# Patient Record
Sex: MALE | Race: WHITE | URBAN - METROPOLITAN AREA
[De-identification: names, ages, dates, MRNs, and addresses within clinical notes are randomized per-mention and may not be internally consistent; named-entity substitution may affect disease eponyms.]

---

## 2024-08-28 ENCOUNTER — OFFICE VISIT (OUTPATIENT)
Dept: PRIMARY CARE CLINIC | Age: 59
End: 2024-08-28

## 2024-08-28 VITALS
WEIGHT: 193.6 LBS | BODY MASS INDEX: 26.22 KG/M2 | SYSTOLIC BLOOD PRESSURE: 120 MMHG | DIASTOLIC BLOOD PRESSURE: 82 MMHG | HEART RATE: 61 BPM | HEIGHT: 72 IN | OXYGEN SATURATION: 98 % | TEMPERATURE: 97.7 F

## 2024-08-28 DIAGNOSIS — E11.42 TYPE 2 DIABETES MELLITUS WITH DIABETIC POLYNEUROPATHY, WITHOUT LONG-TERM CURRENT USE OF INSULIN (HCC): ICD-10-CM

## 2024-08-28 DIAGNOSIS — J44.9 CHRONIC OBSTRUCTIVE PULMONARY DISEASE, UNSPECIFIED COPD TYPE (HCC): ICD-10-CM

## 2024-08-28 DIAGNOSIS — I10 ESSENTIAL HYPERTENSION: ICD-10-CM

## 2024-08-28 DIAGNOSIS — F41.8 MIXED ANXIETY AND DEPRESSIVE DISORDER: ICD-10-CM

## 2024-08-28 DIAGNOSIS — Z12.11 COLON CANCER SCREENING: ICD-10-CM

## 2024-08-28 DIAGNOSIS — Z87.891 PERSONAL HISTORY OF TOBACCO USE: ICD-10-CM

## 2024-08-28 DIAGNOSIS — K21.9 GASTROESOPHAGEAL REFLUX DISEASE WITHOUT ESOPHAGITIS: ICD-10-CM

## 2024-08-28 DIAGNOSIS — E03.9 HYPOTHYROIDISM, UNSPECIFIED TYPE: ICD-10-CM

## 2024-08-28 DIAGNOSIS — Z00.01 ENCOUNTER FOR ROUTINE ADULT HEALTH EXAMINATION WITH ABNORMAL FINDINGS: Primary | ICD-10-CM

## 2024-08-28 DIAGNOSIS — R79.89 LOW TESTOSTERONE: ICD-10-CM

## 2024-08-28 DIAGNOSIS — N52.9 ERECTILE DYSFUNCTION, UNSPECIFIED ERECTILE DYSFUNCTION TYPE: ICD-10-CM

## 2024-08-28 RX ORDER — SEMAGLUTIDE 1.34 MG/ML
1 INJECTION, SOLUTION SUBCUTANEOUS
COMMUNITY
Start: 2024-08-16 | End: 2024-08-28 | Stop reason: SDUPTHER

## 2024-08-28 RX ORDER — FAMOTIDINE 40 MG/1
40 TABLET, FILM COATED ORAL 2 TIMES DAILY
Qty: 30 TABLET | Refills: 0 | Status: SHIPPED | OUTPATIENT
Start: 2024-08-28

## 2024-08-28 RX ORDER — VENLAFAXINE HYDROCHLORIDE 75 MG/1
150 CAPSULE, EXTENDED RELEASE ORAL DAILY
Qty: 30 CAPSULE | Refills: 0 | Status: SHIPPED | OUTPATIENT
Start: 2024-08-28

## 2024-08-28 RX ORDER — ALBUTEROL SULFATE 90 UG/1
2 AEROSOL, METERED RESPIRATORY (INHALATION) EVERY 4 HOURS PRN
COMMUNITY
Start: 2012-02-03 | End: 2024-08-28 | Stop reason: SDUPTHER

## 2024-08-28 RX ORDER — TIOTROPIUM BROMIDE 18 UG/1
18 CAPSULE ORAL; RESPIRATORY (INHALATION) DAILY
Qty: 30 CAPSULE | Refills: 0 | Status: SHIPPED | OUTPATIENT
Start: 2024-08-28

## 2024-08-28 RX ORDER — TESTOSTERONE CYPIONATE 200 MG/ML
200 INJECTION, SOLUTION INTRAMUSCULAR ONCE
COMMUNITY
Start: 2024-06-02

## 2024-08-28 RX ORDER — TIOTROPIUM BROMIDE 18 UG/1
1 CAPSULE ORAL; RESPIRATORY (INHALATION) DAILY
COMMUNITY
Start: 2012-02-03 | End: 2024-08-28 | Stop reason: SDUPTHER

## 2024-08-28 RX ORDER — VENLAFAXINE HYDROCHLORIDE 75 MG/1
150 CAPSULE, EXTENDED RELEASE ORAL DAILY
COMMUNITY
End: 2024-08-28 | Stop reason: SDUPTHER

## 2024-08-28 RX ORDER — GABAPENTIN 300 MG/1
300 CAPSULE ORAL 3 TIMES DAILY
COMMUNITY
Start: 2024-08-15

## 2024-08-28 RX ORDER — SEMAGLUTIDE 1.34 MG/ML
1 INJECTION, SOLUTION SUBCUTANEOUS
Qty: 4 ADJUSTABLE DOSE PRE-FILLED PEN SYRINGE | Refills: 0 | Status: SHIPPED | OUTPATIENT
Start: 2024-08-28

## 2024-08-28 RX ORDER — LORAZEPAM 1 MG/1
1 TABLET ORAL EVERY 8 HOURS PRN
COMMUNITY
Start: 2012-08-13

## 2024-08-28 RX ORDER — ALBUTEROL SULFATE 90 UG/1
2 AEROSOL, METERED RESPIRATORY (INHALATION) EVERY 4 HOURS PRN
Qty: 18 G | Refills: 0 | Status: SHIPPED | OUTPATIENT
Start: 2024-08-28

## 2024-08-28 RX ORDER — FAMOTIDINE 40 MG/1
40 TABLET, FILM COATED ORAL 2 TIMES DAILY
COMMUNITY
End: 2024-08-28 | Stop reason: SDUPTHER

## 2024-08-28 SDOH — ECONOMIC STABILITY: FOOD INSECURITY: WITHIN THE PAST 12 MONTHS, THE FOOD YOU BOUGHT JUST DIDN'T LAST AND YOU DIDN'T HAVE MONEY TO GET MORE.: NEVER TRUE

## 2024-08-28 SDOH — ECONOMIC STABILITY: FOOD INSECURITY: WITHIN THE PAST 12 MONTHS, YOU WORRIED THAT YOUR FOOD WOULD RUN OUT BEFORE YOU GOT MONEY TO BUY MORE.: NEVER TRUE

## 2024-08-28 SDOH — ECONOMIC STABILITY: INCOME INSECURITY: HOW HARD IS IT FOR YOU TO PAY FOR THE VERY BASICS LIKE FOOD, HOUSING, MEDICAL CARE, AND HEATING?: NOT HARD AT ALL

## 2024-08-28 ASSESSMENT — PATIENT HEALTH QUESTIONNAIRE - PHQ9
9. THOUGHTS THAT YOU WOULD BE BETTER OFF DEAD, OR OF HURTING YOURSELF: NOT AT ALL
SUM OF ALL RESPONSES TO PHQ QUESTIONS 1-9: 16
2. FEELING DOWN, DEPRESSED OR HOPELESS: NEARLY EVERY DAY
8. MOVING OR SPEAKING SO SLOWLY THAT OTHER PEOPLE COULD HAVE NOTICED. OR THE OPPOSITE, BEING SO FIGETY OR RESTLESS THAT YOU HAVE BEEN MOVING AROUND A LOT MORE THAN USUAL: MORE THAN HALF THE DAYS
3. TROUBLE FALLING OR STAYING ASLEEP: MORE THAN HALF THE DAYS
6. FEELING BAD ABOUT YOURSELF - OR THAT YOU ARE A FAILURE OR HAVE LET YOURSELF OR YOUR FAMILY DOWN: MORE THAN HALF THE DAYS
SUM OF ALL RESPONSES TO PHQ QUESTIONS 1-9: 16
5. POOR APPETITE OR OVEREATING: NOT AT ALL
4. FEELING TIRED OR HAVING LITTLE ENERGY: NEARLY EVERY DAY
1. LITTLE INTEREST OR PLEASURE IN DOING THINGS: MORE THAN HALF THE DAYS
7. TROUBLE CONCENTRATING ON THINGS, SUCH AS READING THE NEWSPAPER OR WATCHING TELEVISION: MORE THAN HALF THE DAYS
SUM OF ALL RESPONSES TO PHQ9 QUESTIONS 1 & 2: 5
10. IF YOU CHECKED OFF ANY PROBLEMS, HOW DIFFICULT HAVE THESE PROBLEMS MADE IT FOR YOU TO DO YOUR WORK, TAKE CARE OF THINGS AT HOME, OR GET ALONG WITH OTHER PEOPLE: EXTREMELY DIFFICULT

## 2024-08-28 ASSESSMENT — ENCOUNTER SYMPTOMS
ABDOMINAL PAIN: 0
NAUSEA: 0
SHORTNESS OF BREATH: 0
VOMITING: 0
DIARRHEA: 0
COUGH: 0

## 2024-08-28 ASSESSMENT — ANXIETY QUESTIONNAIRES
5. BEING SO RESTLESS THAT IT IS HARD TO SIT STILL: MORE THAN HALF THE DAYS
7. FEELING AFRAID AS IF SOMETHING AWFUL MIGHT HAPPEN: MORE THAN HALF THE DAYS
2. NOT BEING ABLE TO STOP OR CONTROL WORRYING: NEARLY EVERY DAY
4. TROUBLE RELAXING: MORE THAN HALF THE DAYS
6. BECOMING EASILY ANNOYED OR IRRITABLE: NEARLY EVERY DAY
3. WORRYING TOO MUCH ABOUT DIFFERENT THINGS: NEARLY EVERY DAY
IF YOU CHECKED OFF ANY PROBLEMS ON THIS QUESTIONNAIRE, HOW DIFFICULT HAVE THESE PROBLEMS MADE IT FOR YOU TO DO YOUR WORK, TAKE CARE OF THINGS AT HOME, OR GET ALONG WITH OTHER PEOPLE: VERY DIFFICULT
GAD7 TOTAL SCORE: 18
1. FEELING NERVOUS, ANXIOUS, OR ON EDGE: NEARLY EVERY DAY

## 2024-08-28 NOTE — ASSESSMENT & PLAN NOTE
Discussed with the patient the current USPSTF guidelines released March 9, 2021 for screening for lung cancer.    For adults aged 50 to 80 years who have a 20 pack-year smoking history and currently smoke or have quit within the past 15 years the grade B recommendation is to:  Screen for lung cancer with low-dose computed tomography (LDCT) every year.  Stop screening once a person has not smoked for 15 years or has a health problem that limits life expectancy or the ability to have lung surgery.    The patient  reports that he quit smoking about 7 months ago. His smoking use included cigarettes. He started smoking about 45 years ago. He has a 90 pack-year smoking history. He has never used smokeless tobacco.. Discussed with patient the risks and benefits of screening, including over-diagnosis, false positive rate, and total radiation exposure.  The patient currently exhibits no signs or symptoms suggestive of lung cancer.  Discussed with patient the importance of compliance with yearly annual lung cancer screenings and willingness to undergo diagnosis and treatment if screening scan is positive.  In addition, the patient was counseled regarding the importance of remaining smoke free and/or total smoking cessation.    Also reviewed the following if the patient has Medicare that as of February 10, 2022, Medicare only covers LDCT screening in patients aged 50-77 with at least a 20 pack-year smoking history who currently smoke or have quit in the last 15 years

## 2024-08-28 NOTE — ASSESSMENT & PLAN NOTE
PHQ-9 Total Score: 16 (8/28/2024  4:37 PM)  Thoughts that you would be better off dead, or of hurting yourself in some way: 0 (8/28/2024  4:37 PM)        8/28/2024     4:38 PM   LEATHA-7 SCREENING   Feeling nervous, anxious, or on edge Nearly every day   Not being able to stop or control worrying Nearly every day   Worrying too much about different things Nearly every day   Trouble relaxing More than half the days   Being so restless that it is hard to sit still More than half the days   Becoming easily annoyed or irritable Nearly every day   Feeling afraid as if something awful might happen More than half the days   LEATHA-7 Total Score 18   How difficult have these problems made it for you to do your work, take care of things at home, or get along with other people? Very difficult     - Continue effexor 150 mg  - We discussed that I do not prescribe ativan for anxiety  - Information to [facility] provided   Patient has been scheduled for 1230

## 2024-08-28 NOTE — PROGRESS NOTES
2024    SUBJECTIVE  Chief Complaint   Patient presents with    New Patient     Concerns include DM2 management, low testosterone, ED      Ruddy Barillas (:  1965) is a 59 y.o. male w/ PMHx of HTN, COPD, hypothyroidism, T2DM, anxiety, depression, low testosterone  presenting as a new patient to establish care.  Patient is requesting refills of all of his medications.    Patient Active Problem List   Diagnosis    COPD (chronic obstructive pulmonary disease) (HCC)    Essential hypertension    Hypothyroidism    Type 2 diabetes mellitus with diabetic polyneuropathy, without long-term current use of insulin (HCC)    Low testosterone    Erectile dysfunction    Encounter for routine adult health examination with abnormal findings    Personal history of tobacco use    Colon cancer screening    Gastroesophageal reflux disease without esophagitis    Mixed anxiety and depressive disorder       Review of Systems   Constitutional:  Negative for chills, fatigue and fever.   Respiratory:  Negative for cough and shortness of breath.    Cardiovascular:  Negative for chest pain.   Gastrointestinal:  Negative for abdominal pain, diarrhea, nausea and vomiting.   Musculoskeletal:  Negative for arthralgias.   Skin:  Negative for rash.   Neurological:  Negative for dizziness, syncope, weakness, light-headedness, numbness and headaches.       Prior to Visit Medications    Medication Sig Taking? Authorizing Provider   testosterone cypionate (DEPOTESTOTERONE CYPIONATE) 200 MG/ML injection Inject 1 mL into the muscle once. Yes Natalie Preston MD   LORazepam (ATIVAN) 1 MG tablet Take 1 tablet by mouth every 8 hours as needed for Anxiety. Yes Natalie Preston MD   OZEMPIC, 1 MG/DOSE, 4 MG/3ML SOPN sc injection Inject 1 mg into the skin every 7 days Yes Denzel Solis MD   tiotropium (SPIRIVA) 18 MCG inhalation capsule Inhale 1 capsule into the lungs daily Yes Denzel Solis MD   albuterol sulfate HFA  reviewing previous notes, test results and face to face with the patient discussing the diagnosis and importance of compliance with the treatment plan as well as documenting on the day of the visit.    Electronically signed by Denzel Solis MD on 8/28/2024 at 5:25 PM     Please note, documentation for this visit was generated using dragon dictation software.  Although every effort was made to ensure accuracy; inadvertent, unintentional transcription errors may have occurred.

## 2024-08-28 NOTE — ASSESSMENT & PLAN NOTE
Currently only taking Ozempic 1 mg weekly, reports did not tolerate metformin in the past  - Obtain A1c  - Refill Ozempic 1 mg  - Follow up in 1 mo

## 2024-08-28 NOTE — ASSESSMENT & PLAN NOTE
Cancer Screenings  Colorectal Cancer  Start at (45)-50 years and continuing until age 75  - High-sensitivity guaiac FOBT every year  - Stool DNA-FIT (Cologuard) every 3 years  - Flexible sigmoidoscopy every 5 years  - Colonoscopy every 10 years  Cologuard ordered    Smokers  Lung Cancer  Low Dose CT Scan if ages 50-80 and  - 20+ pack year history and/or  - Still smoking or stopped smoking in last 15 years  LDCT Ordered    AAA  - 1 time screening for AAA with U/S in men aged 65 to 75 who have ever smoked    Advised Smoking Cessation- see tobacco user for details    Vaccinations  Influenza vaccine:  recommended every fall, Tetanus vaccine:  tetanus diptheria and pertussis vaccine (Tdap) due now, but will be administered at future appt    Lab Work  - Routine labs ordered    Depression Screening  PHQ-9 Total Score: 16 (8/28/2024  4:37 PM)  Thoughts that you would be better off dead, or of hurting yourself in some way: 0 (8/28/2024  4:37 PM)    - Information to [facility] provided

## 2024-08-29 DIAGNOSIS — E11.42 TYPE 2 DIABETES MELLITUS WITH DIABETIC POLYNEUROPATHY, WITHOUT LONG-TERM CURRENT USE OF INSULIN (HCC): ICD-10-CM

## 2024-08-29 DIAGNOSIS — E03.9 HYPOTHYROIDISM, UNSPECIFIED TYPE: ICD-10-CM

## 2024-08-29 DIAGNOSIS — Z00.01 ENCOUNTER FOR ROUTINE ADULT HEALTH EXAMINATION WITH ABNORMAL FINDINGS: ICD-10-CM

## 2024-08-29 DIAGNOSIS — I10 ESSENTIAL HYPERTENSION: ICD-10-CM

## 2024-08-29 LAB
ALBUMIN SERPL-MCNC: 4.3 G/DL (ref 3.4–5)
ALBUMIN/GLOB SERPL: 1.8 {RATIO} (ref 1.1–2.2)
ALP SERPL-CCNC: 120 U/L (ref 40–129)
ALT SERPL-CCNC: 18 U/L (ref 10–40)
ANION GAP SERPL CALCULATED.3IONS-SCNC: 8 MMOL/L (ref 3–16)
AST SERPL-CCNC: 20 U/L (ref 15–37)
BILIRUB SERPL-MCNC: 0.3 MG/DL (ref 0–1)
BUN SERPL-MCNC: 14 MG/DL (ref 7–20)
CALCIUM SERPL-MCNC: 9.7 MG/DL (ref 8.3–10.6)
CHLORIDE SERPL-SCNC: 103 MMOL/L (ref 99–110)
CHOLEST SERPL-MCNC: 185 MG/DL (ref 0–199)
CO2 SERPL-SCNC: 29 MMOL/L (ref 21–32)
CREAT SERPL-MCNC: 1.1 MG/DL (ref 0.9–1.3)
DEPRECATED RDW RBC AUTO: 13.4 % (ref 12.4–15.4)
GFR SERPLBLD CREATININE-BSD FMLA CKD-EPI: 78 ML/MIN/{1.73_M2}
GLUCOSE SERPL-MCNC: 93 MG/DL (ref 70–99)
HCT VFR BLD AUTO: 48.5 % (ref 40.5–52.5)
HDLC SERPL-MCNC: 37 MG/DL (ref 40–60)
HGB BLD-MCNC: 16.9 G/DL (ref 13.5–17.5)
LDLC SERPL CALC-MCNC: 105 MG/DL
MCH RBC QN AUTO: 32.9 PG (ref 26–34)
MCHC RBC AUTO-ENTMCNC: 34.8 G/DL (ref 31–36)
MCV RBC AUTO: 94.5 FL (ref 80–100)
PLATELET # BLD AUTO: 214 K/UL (ref 135–450)
PMV BLD AUTO: 7.9 FL (ref 5–10.5)
POTASSIUM SERPL-SCNC: 4.7 MMOL/L (ref 3.5–5.1)
PROT SERPL-MCNC: 6.7 G/DL (ref 6.4–8.2)
RBC # BLD AUTO: 5.13 M/UL (ref 4.2–5.9)
SODIUM SERPL-SCNC: 140 MMOL/L (ref 136–145)
TRIGL SERPL-MCNC: 217 MG/DL (ref 0–150)
TSH SERPL DL<=0.005 MIU/L-ACNC: 4.13 UIU/ML (ref 0.27–4.2)
VLDLC SERPL CALC-MCNC: 43 MG/DL
WBC # BLD AUTO: 5.9 K/UL (ref 4–11)

## 2024-08-30 LAB
EST. AVERAGE GLUCOSE BLD GHB EST-MCNC: 93.9 MG/DL
HBA1C MFR BLD: 4.9 %
HCV AB SERPL QL IA: NORMAL
HIV 1+2 AB+HIV1 P24 AG SERPL QL IA: NORMAL
HIV 2 AB SERPL QL IA: NORMAL
HIV1 AB SERPL QL IA: NORMAL
HIV1 P24 AG SERPL QL IA: NORMAL

## 2024-09-24 ENCOUNTER — OFFICE VISIT (OUTPATIENT)
Dept: PRIMARY CARE CLINIC | Age: 59
End: 2024-09-24
Payer: COMMERCIAL

## 2024-09-24 VITALS
OXYGEN SATURATION: 98 % | TEMPERATURE: 98.1 F | HEART RATE: 76 BPM | SYSTOLIC BLOOD PRESSURE: 124 MMHG | DIASTOLIC BLOOD PRESSURE: 88 MMHG | BODY MASS INDEX: 26.8 KG/M2 | WEIGHT: 200 LBS

## 2024-09-24 DIAGNOSIS — E11.42 TYPE 2 DIABETES MELLITUS WITH DIABETIC POLYNEUROPATHY, WITHOUT LONG-TERM CURRENT USE OF INSULIN (HCC): Primary | ICD-10-CM

## 2024-09-24 DIAGNOSIS — Z23 NEED FOR VACCINATION: ICD-10-CM

## 2024-09-24 DIAGNOSIS — M21.372 FOOT DROP, LEFT FOOT: ICD-10-CM

## 2024-09-24 DIAGNOSIS — E78.5 HYPERLIPIDEMIA, UNSPECIFIED HYPERLIPIDEMIA TYPE: ICD-10-CM

## 2024-09-24 DIAGNOSIS — R94.131 ABNORMAL EMG: ICD-10-CM

## 2024-09-24 DIAGNOSIS — I10 ESSENTIAL HYPERTENSION: ICD-10-CM

## 2024-09-24 PROCEDURE — 99214 OFFICE O/P EST MOD 30 MIN: CPT | Performed by: STUDENT IN AN ORGANIZED HEALTH CARE EDUCATION/TRAINING PROGRAM

## 2024-09-24 PROCEDURE — 3074F SYST BP LT 130 MM HG: CPT | Performed by: STUDENT IN AN ORGANIZED HEALTH CARE EDUCATION/TRAINING PROGRAM

## 2024-09-24 PROCEDURE — 90661 CCIIV3 VAC ABX FR 0.5 ML IM: CPT | Performed by: STUDENT IN AN ORGANIZED HEALTH CARE EDUCATION/TRAINING PROGRAM

## 2024-09-24 PROCEDURE — 90471 IMMUNIZATION ADMIN: CPT | Performed by: STUDENT IN AN ORGANIZED HEALTH CARE EDUCATION/TRAINING PROGRAM

## 2024-09-24 PROCEDURE — 3044F HG A1C LEVEL LT 7.0%: CPT | Performed by: STUDENT IN AN ORGANIZED HEALTH CARE EDUCATION/TRAINING PROGRAM

## 2024-09-24 PROCEDURE — 3079F DIAST BP 80-89 MM HG: CPT | Performed by: STUDENT IN AN ORGANIZED HEALTH CARE EDUCATION/TRAINING PROGRAM

## 2024-09-24 RX ORDER — GABAPENTIN 400 MG/1
400 CAPSULE ORAL 3 TIMES DAILY
COMMUNITY
Start: 2024-09-05

## 2024-09-24 RX ORDER — SILDENAFIL 100 MG/1
100 TABLET, FILM COATED ORAL PRN
COMMUNITY
Start: 2024-09-04

## 2024-09-24 ASSESSMENT — ENCOUNTER SYMPTOMS
SHORTNESS OF BREATH: 0
COUGH: 0
VOMITING: 0
ABDOMINAL PAIN: 0
NAUSEA: 0
DIARRHEA: 0

## 2024-09-25 ENCOUNTER — TELEPHONE (OUTPATIENT)
Dept: CARDIOLOGY CLINIC | Age: 59
End: 2024-09-25

## 2024-09-27 PROBLEM — Z12.11 COLON CANCER SCREENING: Status: RESOLVED | Noted: 2024-08-28 | Resolved: 2024-09-27

## 2024-09-27 PROBLEM — Z00.01 ENCOUNTER FOR ROUTINE ADULT HEALTH EXAMINATION WITH ABNORMAL FINDINGS: Status: RESOLVED | Noted: 2024-08-28 | Resolved: 2024-09-27

## 2024-10-15 ENCOUNTER — TELEPHONE (OUTPATIENT)
Dept: PRIMARY CARE CLINIC | Age: 59
End: 2024-10-15

## 2024-10-15 DIAGNOSIS — E11.42 TYPE 2 DIABETES MELLITUS WITH DIABETIC POLYNEUROPATHY, WITHOUT LONG-TERM CURRENT USE OF INSULIN (HCC): ICD-10-CM

## 2024-10-15 DIAGNOSIS — J44.9 CHRONIC OBSTRUCTIVE PULMONARY DISEASE, UNSPECIFIED COPD TYPE (HCC): ICD-10-CM

## 2024-10-15 DIAGNOSIS — K21.9 GASTROESOPHAGEAL REFLUX DISEASE WITHOUT ESOPHAGITIS: ICD-10-CM

## 2024-10-15 PROBLEM — Z91.89 AT RISK FOR CORONARY ARTERY DISEASE: Status: ACTIVE | Noted: 2024-10-15

## 2024-10-15 RX ORDER — FAMOTIDINE 40 MG/1
40 TABLET, FILM COATED ORAL 2 TIMES DAILY
Qty: 30 TABLET | Refills: 0 | Status: SHIPPED | OUTPATIENT
Start: 2024-10-15

## 2024-10-15 RX ORDER — ALBUTEROL SULFATE 90 UG/1
2 INHALANT RESPIRATORY (INHALATION) EVERY 4 HOURS PRN
Qty: 18 G | Refills: 0 | Status: SHIPPED | OUTPATIENT
Start: 2024-10-15

## 2024-10-15 RX ORDER — SEMAGLUTIDE 1.34 MG/ML
1 INJECTION, SOLUTION SUBCUTANEOUS
Qty: 4 ADJUSTABLE DOSE PRE-FILLED PEN SYRINGE | Refills: 0 | Status: SHIPPED | OUTPATIENT
Start: 2024-10-15

## 2024-10-15 RX ORDER — TIOTROPIUM BROMIDE 18 UG/1
18 CAPSULE ORAL; RESPIRATORY (INHALATION) DAILY
Qty: 30 CAPSULE | Refills: 0 | Status: SHIPPED | OUTPATIENT
Start: 2024-10-15

## 2024-10-15 NOTE — TELEPHONE ENCOUNTER
Ruddy needs refills of his Ozempic,Pepcid,Spirva and albuterol sulfate. He says he has been out since Friday.

## 2024-10-15 NOTE — TELEPHONE ENCOUNTER
Medication:   Requested Prescriptions     Pending Prescriptions Disp Refills    famotidine (PEPCID) 40 MG tablet 30 tablet 0     Sig: Take 1 tablet by mouth 2 times daily    tiotropium (SPIRIVA) 18 MCG inhalation capsule 30 capsule 0     Sig: Inhale 1 capsule into the lungs daily    albuterol sulfate HFA (PROVENTIL;VENTOLIN;PROAIR) 108 (90 Base) MCG/ACT inhaler 18 g 0     Sig: Inhale 2 puffs into the lungs every 4 hours as needed for Wheezing    OZEMPIC, 1 MG/DOSE, 4 MG/3ML SOPN sc injection 4 Adjustable Dose Pre-filled Pen Syringe 0     Sig: Inject 1 mg into the skin every 7 days      Last Filled:  8/28/24    Patient Phone Number: [phone] (home)     Last appt: 9/24/2024   Next appt: 12/13/2024    Last OARRS:        No data to display              PDMP Monitoring:    Last PDMP [place] as Reviewed ([place]):  Review User Review Instant Review Result   DENZEL ANTUNEZ 8/28/2024  4:48 PM Reviewed PDMP [1]     Preferred Pharmacy:   [facility]/pharmacy #[id number] - [place], [place] - [address] [phone] - F [phone] [address] [address]  Phone: [phone] Fax: [phone]    Recent Visits  Date Type Provider Dept   09/24/24 Office Visit Denzel Antunez MD [facility]   08/28/24 Office Visit Denzel Antunez MD [facility]   Showing recent visits within past 540 days with a meds authorizing provider and meeting all other requirements  Future Appointments  Date Type Provider Dept   12/13/24 Appointment Denzel Antunez MD [facility]   Showing future appointments within next 150 days with a meds authorizing provider and meeting all other requirements     9/24/2024

## 2024-10-15 NOTE — PROGRESS NOTES
[facility]  10/15/24  Referring: Dr. Solis    REASON FOR CONSULT/CHIEF COMPLAINT/HPI     Reason for visit/ Chief complaint  New Patient  HLD   HPI Rudyd Barillas is a 59 y.o. male patient being seen today for hyperlipidemia referred by his PCP, Dr Solis. Other PMH includes hypertension (currently not any BP meds), COPD, hypothyroidism, type 2 DM, polyneuropathy, anxiety, and depression, and low testosterone.     Patient's PCP recommended high intensity statin for 10 yr ASCVD risk score of 15%. Patient declined statin due to an past intolerance (joint pains). Referral was placed for cardiology to discuss Repatha.    In April of 2018 he presented to [facility] with chest pain. Echo was normal. Mycoardial perfusion imaging was abnormal and LHC was done and showed normal coronaries.    Today he             Patient is adherent with medications and is tolerating them well without side effects     HISTORY/ALLERGIES/ROS     MedHx:  has a past medical history of Anxiety, Depression, Erectile dysfunction, Neuropathy, and Type 2 diabetes mellitus without complication (HCC).  SurgHx:  has a past surgical history that includes hernia repair (01/2/2024).   SocHx:  reports that he quit smoking about 9 months ago. His smoking use included cigarettes. He started smoking about 45 years ago. He has a 90 pack-year smoking history. He has never used smokeless tobacco. He reports that he does not currently use alcohol. He reports that he does not currently use drugs.   FamHx: No family history of premature coronary artery disease, sudden death, or aneurysm  Allergies: Patient has no known allergies.   ROS:   Review of Systems       MEDICATIONS      Prior to Admission medications    Medication Sig Start Date End Date Taking? Authorizing Provider   sildenafil (VIAGRA) 100 MG tablet Take 1 tablet by mouth as needed for Erectile Dysfunction 9/4/24   Provider, MD Natalie   LUER LOCK SAFETY SYRINGES 22G X 1-1/2\" 3 ML MISC
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
## 2024-10-17 ENCOUNTER — OFFICE VISIT (OUTPATIENT)
Dept: CARDIOLOGY CLINIC | Age: 59
End: 2024-10-17

## 2024-10-17 VITALS
SYSTOLIC BLOOD PRESSURE: 122 MMHG | HEART RATE: 66 BPM | WEIGHT: 201 LBS | HEIGHT: 75 IN | DIASTOLIC BLOOD PRESSURE: 62 MMHG | BODY MASS INDEX: 24.99 KG/M2 | OXYGEN SATURATION: 97 %

## 2024-10-17 DIAGNOSIS — E78.2 MIXED HYPERLIPIDEMIA: Primary | ICD-10-CM

## 2024-10-17 DIAGNOSIS — I73.9 CLAUDICATION (HCC): ICD-10-CM

## 2024-10-17 DIAGNOSIS — Z91.89 AT RISK FOR CORONARY ARTERY DISEASE: ICD-10-CM

## 2024-10-17 DIAGNOSIS — E11.42 TYPE 2 DIABETES MELLITUS WITH DIABETIC POLYNEUROPATHY, WITHOUT LONG-TERM CURRENT USE OF INSULIN (HCC): ICD-10-CM

## 2024-10-17 RX ORDER — ROSUVASTATIN CALCIUM 20 MG/1
20 TABLET, COATED ORAL DAILY
Qty: 90 TABLET | Refills: 3 | Status: SHIPPED | OUTPATIENT
Start: 2024-10-17

## 2024-10-17 NOTE — PATIENT INSTRUCTIONS
Start taking Rosuvastatin 20mg daily    Repeat cholesterol panel in 3 months    Schedule blood flow testing for legs

## 2024-10-24 ENCOUNTER — HOSPITAL ENCOUNTER (OUTPATIENT)
Dept: VASCULAR LAB | Age: 59
Discharge: HOME | End: 2024-10-26
Attending: INTERNAL MEDICINE
Payer: COMMERCIAL

## 2024-10-24 DIAGNOSIS — I73.9 CLAUDICATION (HCC): ICD-10-CM

## 2024-10-24 LAB
VAS LEFT ABI: 1.07
VAS LEFT ARM BP: 141 MMHG
VAS LEFT ATA DIST PSV: 48.4 CM/S
VAS LEFT ATA PROX PSV: 78.6 CM/S
VAS LEFT CFA DIST PSV: 79.6 CM/S
VAS LEFT CFA PROX PSV: 155 CM/S
VAS LEFT DORSALIS PEDIS BP: 172 MMHG
VAS LEFT PERONEAL DIST PSV: 37.6 CM/S
VAS LEFT PERONEAL PROX PSV: 36.9 CM/S
VAS LEFT PFA PROX PSV: 83 CM/S
VAS LEFT POP A DIST PSV: 56.4 CM/S
VAS LEFT POP A PROX PSV: 69.8 CM/S
VAS LEFT POP A PROX VEL RATIO: 0.75
VAS LEFT PTA BP: 174 MMHG
VAS LEFT PTA PROX PSV: 49.8 CM/S
VAS LEFT SFA DIST PSV: 92.6 CM/S
VAS LEFT SFA DIST VEL RATIO: 1.01
VAS LEFT SFA MID PSV: 91.3 CM/S
VAS LEFT SFA MID VEL RATIO: 1.08
VAS LEFT SFA PROX PSV: 84.5 CM/S
VAS LEFT SFA PROX VEL RATIO: 0.55
VAS RIGHT ABI: 1.15
VAS RIGHT ARM BP: 162 MMHG
VAS RIGHT ATA DIST PSV: 50.8 CM/S
VAS RIGHT ATA PROX PSV: 70.2 CM/S
VAS RIGHT CFA DIST PSV: 86.7 CM/S
VAS RIGHT CFA PROX PSV: 103 CM/S
VAS RIGHT DORSALIS PEDIS BP: 187 MMHG
VAS RIGHT PERONEAL DIST PSV: 42 CM/S
VAS RIGHT PERONEAL PROX PSV: 49.4 CM/S
VAS RIGHT PFA PROX PSV: 86.2 CM/S
VAS RIGHT POP A DIST PSV: 71.6 CM/S
VAS RIGHT POP A PROX PSV: 54.9 CM/S
VAS RIGHT POP A PROX VEL RATIO: 0.66
VAS RIGHT PTA BP: 178 MMHG
VAS RIGHT PTA DIST PSV: 76 CM/S
VAS RIGHT PTA PROX PSV: 64.1 CM/S
VAS RIGHT SFA DIST PSV: 83.2 CM/S
VAS RIGHT SFA DIST VEL RATIO: 0.85
VAS RIGHT SFA MID PSV: 98.2 CM/S
VAS RIGHT SFA MID VEL RATIO: 1.3
VAS RIGHT SFA PROX PSV: 76.7 CM/S
VAS RIGHT SFA PROX VEL RATIO: 0.7

## 2024-10-24 PROCEDURE — 93925 LOWER EXTREMITY STUDY: CPT

## 2024-10-29 ENCOUNTER — OFFICE VISIT (OUTPATIENT)
Dept: ORTHOPEDIC SURGERY | Age: 59
End: 2024-10-29
Payer: COMMERCIAL

## 2024-10-29 VITALS — BODY MASS INDEX: 24.99 KG/M2 | HEIGHT: 75 IN | WEIGHT: 201 LBS

## 2024-10-29 DIAGNOSIS — M21.372 LEFT FOOT DROP: ICD-10-CM

## 2024-10-29 DIAGNOSIS — G62.89 OTHER POLYNEUROPATHY: Primary | ICD-10-CM

## 2024-10-29 PROCEDURE — 99244 OFF/OP CNSLTJ NEW/EST MOD 40: CPT | Performed by: ORTHOPAEDIC SURGERY

## 2024-10-29 PROCEDURE — G8419 CALC BMI OUT NRM PARAM NOF/U: HCPCS | Performed by: ORTHOPAEDIC SURGERY

## 2024-10-29 PROCEDURE — G8484 FLU IMMUNIZE NO ADMIN: HCPCS | Performed by: ORTHOPAEDIC SURGERY

## 2024-10-29 PROCEDURE — G8427 DOCREV CUR MEDS BY ELIG CLIN: HCPCS | Performed by: ORTHOPAEDIC SURGERY

## 2024-10-30 PROBLEM — G62.9 PERIPHERAL NEUROPATHY: Status: ACTIVE | Noted: 2024-10-30

## 2024-10-30 NOTE — PROGRESS NOTES
CHIEF COMPLAINT:   1- Left ankle weakness/foot drop/common peroneal dysfunction.  2- Bilateral foot numbness/ peripheral neuropathy.    HISTORY:  Mr. Barillas 59 y.o.  male presents today for consultation request from Denzel Solis MD for evaluation of left ankle weakness and foot drop which started 2024. He also noticed numbness/ tingling since 2024. He is complaining of sharp pain, 9/10.  Pain and weakness are increase with walking, and decrease with rest. Pain is achy with and radiates to lateral leg, and numbness and tingling sensation. No other complaint.  He had a h/o LBP wityh herniated disk treated with KAREN in .    Past Medical History:   Diagnosis Date    Anxiety 2006    Depression 2006    Erectile dysfunction 2023    Neuropathy 10/01/23    Type 2 diabetes mellitus without complication (HCC) 2/10/23       Past Surgical History:   Procedure Laterality Date    HERNIA REPAIR  2024       Social History     Socioeconomic History    Marital status:      Spouse name: Not on file    Number of children: Not on file    Years of education: Not on file    Highest education level: Not on file   Occupational History    Not on file   Tobacco Use    Smoking status: Former     Current packs/day: 0.00     Average packs/day: 2.0 packs/day for 45.0 years (90.0 ttl pk-yrs)     Types: Cigarettes     Start date: 1979     Quit date:      Years since quittin.8    Smokeless tobacco: Never   Vaping Use    Vaping status: Every Day    Substances: just vapor and flavoring    Devices: Disposable   Substance and Sexual Activity    Alcohol use: Not Currently    Drug use: Not Currently    Sexual activity: Yes     Partners: Female   Other Topics Concern    Not on file   Social History Narrative    Not on file     Social Determinants of Health     Financial Resource Strain: Low Risk  (2024)    Overall Financial Resource Strain (CARDIA)     Difficulty of Paying Living

## 2024-10-31 ENCOUNTER — TELEPHONE (OUTPATIENT)
Dept: PRIMARY CARE CLINIC | Age: 59
End: 2024-10-31

## 2024-10-31 NOTE — TELEPHONE ENCOUNTER
Called patient to confirm he received the Cologuard collection kit and answer any questions he might have, left vm and sent [employer] message.

## 2024-11-05 ENCOUNTER — TELEPHONE (OUTPATIENT)
Dept: CARDIOLOGY CLINIC | Age: 59
End: 2024-11-05

## 2024-11-05 NOTE — TELEPHONE ENCOUNTER
----- Message from HIGINIO MALOCLM RN sent at 11/5/2024  4:12 PM EST -----  Please let him know Dr Purvis looked at his abis, and they are normal  no sgns of blockages, thanks

## 2024-11-11 ENCOUNTER — TELEPHONE (OUTPATIENT)
Dept: ORTHOPEDIC SURGERY | Age: 59
End: 2024-11-11

## 2024-11-11 DIAGNOSIS — G57.32 ENTRAPMENT NEUROPATHY OF LEFT COMMON PERONEAL NERVE: Primary | ICD-10-CM

## 2024-11-11 NOTE — TELEPHONE ENCOUNTER
Surgery and/or Procedure Scheduling     Contact Name: Ruddy Barillas   Surgical/Procedure Request: LT LEG SURGERY  Patient Contact Number: [phone]      PATIENT CALLED REQ TO  SCHEDULE HIS LT LEG  SURGERY WITH  FOR ON 12/01/24    PLEASE CALL PATIENT BACK AT THE ABOVE NUMBER

## 2024-11-11 NOTE — TELEPHONE ENCOUNTER
SW patient. Informed him that Caity is out of the office this week and will call him next week when she is back to work.   He would like to schedule his surgery in Early December.   Sx letter placed and routed to Caity

## 2024-11-19 ENCOUNTER — TELEPHONE (OUTPATIENT)
Dept: ORTHOPEDIC SURGERY | Age: 59
End: 2024-11-19

## 2024-11-19 ENCOUNTER — PREP FOR PROCEDURE (OUTPATIENT)
Dept: ORTHOPEDIC SURGERY | Age: 59
End: 2024-11-19

## 2024-11-19 DIAGNOSIS — S86.312A TEAR OF PERONEAL TENDON OF LEFT FOOT: ICD-10-CM

## 2024-11-19 DIAGNOSIS — K21.9 GASTROESOPHAGEAL REFLUX DISEASE WITHOUT ESOPHAGITIS: ICD-10-CM

## 2024-11-19 DIAGNOSIS — J44.9 CHRONIC OBSTRUCTIVE PULMONARY DISEASE, UNSPECIFIED COPD TYPE (HCC): ICD-10-CM

## 2024-11-19 RX ORDER — FAMOTIDINE 40 MG/1
40 TABLET, FILM COATED ORAL 2 TIMES DAILY
Qty: 30 TABLET | Refills: 0 | Status: SHIPPED | OUTPATIENT
Start: 2024-11-19

## 2024-11-19 RX ORDER — TIOTROPIUM BROMIDE 18 UG/1
18 CAPSULE ORAL; RESPIRATORY (INHALATION) DAILY
Qty: 30 CAPSULE | Refills: 0 | Status: SHIPPED | OUTPATIENT
Start: 2024-11-19

## 2024-11-19 NOTE — TELEPHONE ENCOUNTER
Surgery and/or Procedure Scheduling     Contact Name: Ruddy Barillas   Surgical/Procedure Request: SCHEDULE SURGERY  Patient Contact Number: [phone]

## 2024-11-19 NOTE — TELEPHONE ENCOUNTER
Medication:   Requested Prescriptions     Pending Prescriptions Disp Refills    tiotropium (SPIRIVA) 18 MCG inhalation capsule 30 capsule 0     Sig: Inhale 1 capsule into the lungs daily      Last Filled:  10/15/24    Patient Phone Number: [phone] (home)     Last appt: 9/24/2024   Next appt: 12/13/2024    Last OARRS:        No data to display              PDMP Monitoring:    Last PDMP Allan as Reviewed ([place]):  Review User Review Instant Review Result   DENZEL ANTUNEZ 8/28/2024  4:48 PM Reviewed PDMP [1]     Preferred Pharmacy:   [facility]/pharmacy #[id number] [place], [place] - [address] [phone] - F [phone] [address] [facility] [address]  Phone: [phone] Fax: [phone]    Recent Visits  Date Type Provider Dept   09/24/24 Office Visit Denzel Antunez MD [facility]   08/28/24 Office Visit Denzel Antunez MD [facility]   Showing recent visits within past 540 days with a meds authorizing provider and meeting all other requirements  Future Appointments  Date Type Provider Dept   12/13/24 Appointment Denzel Antunez MD [facility]   Showing future appointments within next 150 days with a meds authorizing provider and meeting all other requirements     9/24/2024

## 2024-11-19 NOTE — TELEPHONE ENCOUNTER
Medication:   Requested Prescriptions     Pending Prescriptions Disp Refills    famotidine (PEPCID) 40 MG tablet 30 tablet 0     Sig: Take 1 tablet by mouth 2 times daily      Last Filled:  10/15/24    Patient Phone Number: [phone] (home)     Last appt: 9/24/2024   Next appt: 12/13/2024    Last OARRS:        No data to display              PDMP Monitoring:    Last PDMP Allan as Reviewed ([place]):  Review User Review Instant Review Result   DENZEL ANTUNEZ 8/28/2024  4:48 PM Reviewed PDMP [1]     Preferred Pharmacy:   [facility]/pharmacy #[id number] [place], [place] - [address] [phone] - F [phone]  [facility] [address]  Phone: [phone] Fax: [phone]    Recent Visits  Date Type Provider Dept   09/24/24 Office Visit Den[facility] Antunez MD [facility]   08/28/24 Office Visit Denzel Antunez MD [facility]   Showing recent visits within past 540 days with a meds authorizing provider and meeting all other requirements  Future Appointments  Date Type Provider Dept   12/13/24 Appointment Denzel Antunez MD [facility]   Showing future appointments within next 150 days with a meds authorizing provider and meeting all other requirements     9/24/2024

## 2024-11-21 ENCOUNTER — TELEPHONE (OUTPATIENT)
Dept: PRIMARY CARE CLINIC | Age: 59
End: 2024-11-21

## 2024-11-21 DIAGNOSIS — E11.42 TYPE 2 DIABETES MELLITUS WITH DIABETIC POLYNEUROPATHY, WITHOUT LONG-TERM CURRENT USE OF INSULIN (HCC): ICD-10-CM

## 2024-11-21 RX ORDER — SEMAGLUTIDE 1.34 MG/ML
1 INJECTION, SOLUTION SUBCUTANEOUS
Qty: 4 ADJUSTABLE DOSE PRE-FILLED PEN SYRINGE | Refills: 0 | Status: SHIPPED | OUTPATIENT
Start: 2024-11-21

## 2024-11-21 NOTE — TELEPHONE ENCOUNTER
Medication:   Requested Prescriptions     Pending Prescriptions Disp Refills    OZEMPIC, 1 MG/DOSE, 4 MG/3ML SOPN sc injection 4 Adjustable Dose Pre-filled Pen Syringe 0     Sig: Inject 1 mg into the skin every 7 days      Last Filled:  10/15/24    Patient Phone Number: [phone] (home)     Last appt: 9/24/2024   Next appt: 12/13/2024    Last OARRS:        No data to display              PDMP Monitoring:    Last PDMP Allan as Reviewed ([place]):  Review User Review Instant Review Result   DENZEL ANTUNEZ 8/28/2024  4:48 PM Reviewed PDMP [1]     Preferred Pharmacy:   [facility]/pharmacy #[id number] - [place], [place] - [address] [phone] - F [phone] [address] [facility] [address]  Phone: [phone] Fax: [phone]    Recent Visits  Date Type Provider Dept   09/24/24 [facility]   08/28/24 [facility]   Showing recent visits within past 540 days with a meds authorizing provider and meeting all other requirements  Future Appointments  Date Type Provider Dept   12/13/24 Appointment [facility]   Showing future appointments within next 150 days with a meds authorizing provider and meeting all other requirements     9/24/2024

## 2024-11-21 NOTE — TELEPHONE ENCOUNTER
[facility]/pharmacy #[id number] - [place], [place] - [address] [facility] [address] [phone] - F [phone]   [address] [address]   Phone:  [phone]  Fax:  [phone]       OZEMPIC, 1 MG/DOSE, 4 MG/3ML SOPN sc injection [[id number]]

## 2024-12-03 ENCOUNTER — OFFICE VISIT (OUTPATIENT)
Dept: ORTHOPEDIC SURGERY | Age: 59
End: 2024-12-03
Payer: COMMERCIAL

## 2024-12-03 VITALS — BODY MASS INDEX: 24.99 KG/M2 | WEIGHT: 201 LBS | HEIGHT: 75 IN

## 2024-12-03 DIAGNOSIS — G62.89 OTHER POLYNEUROPATHY: ICD-10-CM

## 2024-12-03 DIAGNOSIS — M21.372 LEFT FOOT DROP: Primary | ICD-10-CM

## 2024-12-03 PROCEDURE — 1036F TOBACCO NON-USER: CPT | Performed by: ORTHOPAEDIC SURGERY

## 2024-12-03 PROCEDURE — 3017F COLORECTAL CA SCREEN DOC REV: CPT | Performed by: ORTHOPAEDIC SURGERY

## 2024-12-03 PROCEDURE — G8484 FLU IMMUNIZE NO ADMIN: HCPCS | Performed by: ORTHOPAEDIC SURGERY

## 2024-12-03 PROCEDURE — 99214 OFFICE O/P EST MOD 30 MIN: CPT | Performed by: ORTHOPAEDIC SURGERY

## 2024-12-03 PROCEDURE — G8419 CALC BMI OUT NRM PARAM NOF/U: HCPCS | Performed by: ORTHOPAEDIC SURGERY

## 2024-12-03 PROCEDURE — G8427 DOCREV CUR MEDS BY ELIG CLIN: HCPCS | Performed by: ORTHOPAEDIC SURGERY

## 2024-12-03 NOTE — PROGRESS NOTES
CHIEF COMPLAINT:   1- Left ankle weakness/foot drop/common peroneal dysfunction.  2- Bilateral foot numbness/ peripheral neuropathy.    HISTORY:  Mr. Barillas 59 y.o.  male presents today for f/u and reported improvement in his pain after using AFO, but still with weakness. He was seen on 10/29/2024 as a consultation request from Denzel Solis MD for evaluation of left ankle weakness and foot drop which started 2024. He also noticed numbness/ tingling since 2024.  Pain is achy with and radiates to lateral leg, and numbness and tingling sensation. No other complaint.  He had a h/o LBP with herniated disk treated with KAREN in .    Past Medical History:   Diagnosis Date    Anxiety 2006    Depression 2006    Erectile dysfunction 2023    Neuropathy 10/01/23    Type 2 diabetes mellitus without complication (HCC) 2/10/23       Past Surgical History:   Procedure Laterality Date    HERNIA REPAIR  2024       Social History     Socioeconomic History    Marital status:      Spouse name: Not on file    Number of children: Not on file    Years of education: Not on file    Highest education level: Not on file   Occupational History    Not on file   Tobacco Use    Smoking status: Former     Current packs/day: 0.00     Average packs/day: 2.0 packs/day for 45.0 years (90.0 ttl pk-yrs)     Types: Cigarettes     Start date: 1979     Quit date:      Years since quittin.9    Smokeless tobacco: Never   Vaping Use    Vaping status: Every Day    Substances: just vapor and flavoring    Devices: Disposable   Substance and Sexual Activity    Alcohol use: Not Currently    Drug use: Not Currently    Sexual activity: Yes     Partners: Female   Other Topics Concern    Not on file   Social History Narrative    Not on file     Social Determinants of Health     Financial Resource Strain: Low Risk  (2024)    Overall Financial Resource Strain (CARDIA)     Difficulty of Paying Living

## 2024-12-04 ENCOUNTER — TELEPHONE (OUTPATIENT)
Dept: ORTHOPEDIC SURGERY | Age: 59
End: 2024-12-04

## 2024-12-04 ENCOUNTER — ANESTHESIA EVENT (OUTPATIENT)
Dept: OPERATING ROOM | Age: 59
End: 2024-12-04
Payer: COMMERCIAL

## 2024-12-04 NOTE — PROGRESS NOTES
Patient not reached.  Preop instructions left on voice mail. [phone]______________    -Date_12/5/2024______time_______arrival__per office__________  -Nothing to eat or drink after midnight  -Responsible adult 18 or older to stay on site while you are here and drive you home and stay with you after  -Follow any instructions your doctors office has given you  -Bring a complete list of all your medications and supplements  -If you normally take the following medications in the morning please do so with a small    sip of water-heart,blood pressure,seizure,breathing or thyroid-avoid water pilll Do not take blood pressure medications ending in \"iker\" or \"pril\" the AM of surgery or the dena prior  -You may use your inhalers  -Take half of your normal dose of any long acting insulins the night before-do not take    any diabetic medications in the morning  -If you are taking a weekly injection for diabetes or weight loss-do not take one week prior to surgery. If you have already taken your injection this week, contact your surgeon  -Follow your doctors instructions regarding blood thinners  -Any questions call your surgeons office            VISITOR POLICY(subject to change)    Current policy is 2 visitors per patient. No children. Masks at discretion of facility. Visiting hours are 8a-8p. Overnight visitors will be at the discretion of the nurse. All policies are subject to change.

## 2024-12-04 NOTE — TELEPHONE ENCOUNTER
Surgery and/or Procedure Scheduling     Contact Name: Ruddy Barillas   Surgical/Procedure Request: LT LEG SX 12/5  Patient Contact Number: [phone]       PATIENT IS REQ A RETURN CALL REGARDING SX AND ARRIVE FOR TOMORROW.    PLEASE RETURN CALL TO THE ABOVE NUMBER

## 2024-12-04 NOTE — ANESTHESIA PRE PROCEDURE
Department of Anesthesiology  Preprocedure Note       Name:  Ruddy Barillas   Age:  59 y.o.  :  1965                                          MRN:  [id number]         Date:  2024      Surgeon: Surgeon(s):  Eleanor Llanos MD    Procedure: Procedure(s):  LEFT LOWER LEG COMMON PERONEAL NERVE RELEASE    Medications prior to admission:   Prior to Admission medications    Medication Sig Start Date End Date Taking? Authorizing Provider   OZEMPIC, 1 MG/DOSE, 4 MG/3ML SOPN sc injection Inject 1 mg into the skin every 7 days 24   Denzel Solis MD   famotidine (PEPCID) 40 MG tablet Take 1 tablet by mouth 2 times daily 24   Denzel Solis MD   tiotropium (SPIRIVA) 18 MCG inhalation capsule Inhale 1 capsule into the lungs daily 24   Denzel Solis MD   rosuvastatin (CRESTOR) 20 MG tablet Take 1 tablet by mouth daily 10/17/24   Jona Purvis DO   albuterol sulfate HFA (PROVENTIL;VENTOLIN;PROAIR) 108 (90 Base) MCG/ACT inhaler Inhale 2 puffs into the lungs every 4 hours as needed for Wheezing 10/15/24   Denzel Solis MD   sildenafil (VIAGRA) 100 MG tablet Take 1 tablet by mouth as needed for Erectile Dysfunction 24   Natalie Preston MD   LUER LOCK SAFETY SYRINGES 22G X 1-1/2\" 3 ML MISC  24   Natalie Preston MD   gabapentin (NEURONTIN) 400 MG capsule Take 1 capsule by mouth 3 times daily. 24   Natalie Preston MD   testosterone cypionate (DEPOTESTOTERONE CYPIONATE) 200 MG/ML injection Inject 1 mL into the muscle once. 24   Natalie Preston MD   LORazepam (ATIVAN) 1 MG tablet Take 1 tablet by mouth every 8 hours as needed for Anxiety. 12   Natalie Preston MD   venlafaxine (EFFEXOR XR) 75 MG extended release capsule Take 2 capsules by mouth daily 24   Denzel Solis MD       Current medications:    No current facility-administered medications for this encounter.     Current Outpatient Medications   Medication Sig Dispense Refill   •

## 2024-12-04 NOTE — TELEPHONE ENCOUNTER
Surgery and/or Procedure Scheduling     Contact Name: Ruddy Barillas     Surgical/Procedure Request: ARRIVAL TIME AND SX TIME     Patient Contact Number: [phone]      PLEASE CALL ASAP WITH TIME OF ARRIVAL AND SX TIME. Pt IS HAVING SX TOMORROW.

## 2024-12-05 ENCOUNTER — ANESTHESIA (OUTPATIENT)
Dept: OPERATING ROOM | Age: 59
End: 2024-12-05
Payer: COMMERCIAL

## 2024-12-05 ENCOUNTER — HOSPITAL ENCOUNTER (OUTPATIENT)
Age: 59
Setting detail: OUTPATIENT SURGERY
Discharge: HOME | End: 2024-12-05
Attending: ORTHOPAEDIC SURGERY | Admitting: ORTHOPAEDIC SURGERY
Payer: COMMERCIAL

## 2024-12-05 VITALS
TEMPERATURE: 97.6 F | HEIGHT: 75 IN | SYSTOLIC BLOOD PRESSURE: 107 MMHG | RESPIRATION RATE: 10 BRPM | DIASTOLIC BLOOD PRESSURE: 72 MMHG | BODY MASS INDEX: 26.13 KG/M2 | WEIGHT: 210.13 LBS | OXYGEN SATURATION: 100 % | HEART RATE: 51 BPM

## 2024-12-05 DIAGNOSIS — M21.372 LEFT FOOT DROP: ICD-10-CM

## 2024-12-05 DIAGNOSIS — G57.02 COMPRESSION OF COMMON PERONEAL NERVE OF LEFT LOWER EXTREMITY: Primary | ICD-10-CM

## 2024-12-05 LAB
GLUCOSE BLD-MCNC: 118 MG/DL (ref 70–99)
GLUCOSE BLD-MCNC: 82 MG/DL (ref 70–99)
PERFORMED ON: ABNORMAL
PERFORMED ON: NORMAL

## 2024-12-05 PROCEDURE — 2709999900 HC NON-CHARGEABLE SUPPLY: Performed by: ORTHOPAEDIC SURGERY

## 2024-12-05 PROCEDURE — 6360000002 HC RX W HCPCS: Performed by: NURSE ANESTHETIST, CERTIFIED REGISTERED

## 2024-12-05 PROCEDURE — 7100000000 HC PACU RECOVERY - FIRST 15 MIN: Performed by: ORTHOPAEDIC SURGERY

## 2024-12-05 PROCEDURE — 6360000002 HC RX W HCPCS: Performed by: ORTHOPAEDIC SURGERY

## 2024-12-05 PROCEDURE — 2500000003 HC RX 250 WO HCPCS: Performed by: NURSE ANESTHETIST, CERTIFIED REGISTERED

## 2024-12-05 PROCEDURE — 2580000003 HC RX 258: Performed by: NURSE ANESTHETIST, CERTIFIED REGISTERED

## 2024-12-05 PROCEDURE — 2580000003 HC RX 258: Performed by: ORTHOPAEDIC SURGERY

## 2024-12-05 PROCEDURE — 6370000000 HC RX 637 (ALT 250 FOR IP): Performed by: STUDENT IN AN ORGANIZED HEALTH CARE EDUCATION/TRAINING PROGRAM

## 2024-12-05 PROCEDURE — 3700000000 HC ANESTHESIA ATTENDED CARE: Performed by: ORTHOPAEDIC SURGERY

## 2024-12-05 PROCEDURE — 7100000001 HC PACU RECOVERY - ADDTL 15 MIN: Performed by: ORTHOPAEDIC SURGERY

## 2024-12-05 PROCEDURE — A4217 STERILE WATER/SALINE, 500 ML: HCPCS | Performed by: ORTHOPAEDIC SURGERY

## 2024-12-05 PROCEDURE — 3600000002 HC SURGERY LEVEL 2 BASE: Performed by: ORTHOPAEDIC SURGERY

## 2024-12-05 PROCEDURE — 3700000001 HC ADD 15 MINUTES (ANESTHESIA): Performed by: ORTHOPAEDIC SURGERY

## 2024-12-05 PROCEDURE — 7100000011 HC PHASE II RECOVERY - ADDTL 15 MIN: Performed by: ORTHOPAEDIC SURGERY

## 2024-12-05 PROCEDURE — 7100000010 HC PHASE II RECOVERY - FIRST 15 MIN: Performed by: ORTHOPAEDIC SURGERY

## 2024-12-05 PROCEDURE — 3600000012 HC SURGERY LEVEL 2 ADDTL 15MIN: Performed by: ORTHOPAEDIC SURGERY

## 2024-12-05 RX ORDER — ONDANSETRON 2 MG/ML
INJECTION INTRAMUSCULAR; INTRAVENOUS
Status: DISCONTINUED | OUTPATIENT
Start: 2024-12-05 | End: 2024-12-05 | Stop reason: SDUPTHER

## 2024-12-05 RX ORDER — DEXAMETHASONE SODIUM PHOSPHATE 4 MG/ML
INJECTION, SOLUTION INTRA-ARTICULAR; INTRALESIONAL; INTRAMUSCULAR; INTRAVENOUS; SOFT TISSUE
Status: DISCONTINUED | OUTPATIENT
Start: 2024-12-05 | End: 2024-12-05 | Stop reason: SDUPTHER

## 2024-12-05 RX ORDER — HYDROMORPHONE HYDROCHLORIDE 2 MG/ML
0.25 INJECTION, SOLUTION INTRAMUSCULAR; INTRAVENOUS; SUBCUTANEOUS EVERY 5 MIN PRN
Status: DISCONTINUED | OUTPATIENT
Start: 2024-12-05 | End: 2024-12-05 | Stop reason: HOSPADM

## 2024-12-05 RX ORDER — CEPHALEXIN 500 MG/1
500 CAPSULE ORAL 4 TIMES DAILY
Qty: 12 CAPSULE | Refills: 0 | Status: SHIPPED | OUTPATIENT
Start: 2024-12-05 | End: 2024-12-08

## 2024-12-05 RX ORDER — LABETALOL HYDROCHLORIDE 5 MG/ML
10 INJECTION, SOLUTION INTRAVENOUS
Status: DISCONTINUED | OUTPATIENT
Start: 2024-12-05 | End: 2024-12-05 | Stop reason: HOSPADM

## 2024-12-05 RX ORDER — GLYCOPYRROLATE 0.2 MG/ML
INJECTION INTRAMUSCULAR; INTRAVENOUS
Status: DISCONTINUED | OUTPATIENT
Start: 2024-12-05 | End: 2024-12-05 | Stop reason: SDUPTHER

## 2024-12-05 RX ORDER — NALOXONE HYDROCHLORIDE 0.4 MG/ML
INJECTION, SOLUTION INTRAMUSCULAR; INTRAVENOUS; SUBCUTANEOUS PRN
Status: DISCONTINUED | OUTPATIENT
Start: 2024-12-05 | End: 2024-12-05 | Stop reason: HOSPADM

## 2024-12-05 RX ORDER — ACETAMINOPHEN 500 MG
1000 TABLET ORAL ONCE
Status: COMPLETED | OUTPATIENT
Start: 2024-12-05 | End: 2024-12-05

## 2024-12-05 RX ORDER — HALOPERIDOL 5 MG/ML
1 INJECTION INTRAMUSCULAR
Status: DISCONTINUED | OUTPATIENT
Start: 2024-12-05 | End: 2024-12-05 | Stop reason: HOSPADM

## 2024-12-05 RX ORDER — HYDRALAZINE HYDROCHLORIDE 20 MG/ML
10 INJECTION INTRAMUSCULAR; INTRAVENOUS
Status: DISCONTINUED | OUTPATIENT
Start: 2024-12-05 | End: 2024-12-05 | Stop reason: HOSPADM

## 2024-12-05 RX ORDER — LIDOCAINE HYDROCHLORIDE 20 MG/ML
INJECTION, SOLUTION INFILTRATION; PERINEURAL
Status: DISCONTINUED | OUTPATIENT
Start: 2024-12-05 | End: 2024-12-05 | Stop reason: SDUPTHER

## 2024-12-05 RX ORDER — SODIUM CHLORIDE 0.9 % (FLUSH) 0.9 %
5-40 SYRINGE (ML) INJECTION EVERY 12 HOURS SCHEDULED
Status: DISCONTINUED | OUTPATIENT
Start: 2024-12-05 | End: 2024-12-05 | Stop reason: HOSPADM

## 2024-12-05 RX ORDER — HYDROMORPHONE HYDROCHLORIDE 2 MG/ML
0.5 INJECTION, SOLUTION INTRAMUSCULAR; INTRAVENOUS; SUBCUTANEOUS EVERY 5 MIN PRN
Status: DISCONTINUED | OUTPATIENT
Start: 2024-12-05 | End: 2024-12-05 | Stop reason: HOSPADM

## 2024-12-05 RX ORDER — OXYCODONE HYDROCHLORIDE 5 MG/1
5 TABLET ORAL PRN
Status: COMPLETED | OUTPATIENT
Start: 2024-12-05 | End: 2024-12-05

## 2024-12-05 RX ORDER — PHENYLEPHRINE HCL IN 0.9% NACL 1 MG/10 ML
SYRINGE (ML) INTRAVENOUS
Status: DISCONTINUED | OUTPATIENT
Start: 2024-12-05 | End: 2024-12-05 | Stop reason: SDUPTHER

## 2024-12-05 RX ORDER — OXYCODONE HYDROCHLORIDE 5 MG/1
10 TABLET ORAL PRN
Status: COMPLETED | OUTPATIENT
Start: 2024-12-05 | End: 2024-12-05

## 2024-12-05 RX ORDER — FENTANYL CITRATE 50 UG/ML
INJECTION, SOLUTION INTRAMUSCULAR; INTRAVENOUS
Status: DISCONTINUED | OUTPATIENT
Start: 2024-12-05 | End: 2024-12-05 | Stop reason: SDUPTHER

## 2024-12-05 RX ORDER — PROPOFOL 10 MG/ML
INJECTION, EMULSION INTRAVENOUS
Status: DISCONTINUED | OUTPATIENT
Start: 2024-12-05 | End: 2024-12-05 | Stop reason: SDUPTHER

## 2024-12-05 RX ORDER — EPHEDRINE SULFATE 50 MG/ML
INJECTION INTRAVENOUS
Status: DISCONTINUED | OUTPATIENT
Start: 2024-12-05 | End: 2024-12-05 | Stop reason: SDUPTHER

## 2024-12-05 RX ORDER — IPRATROPIUM BROMIDE AND ALBUTEROL SULFATE 2.5; .5 MG/3ML; MG/3ML
1 SOLUTION RESPIRATORY (INHALATION)
Status: DISCONTINUED | OUTPATIENT
Start: 2024-12-05 | End: 2024-12-05 | Stop reason: HOSPADM

## 2024-12-05 RX ORDER — TRAMADOL HYDROCHLORIDE 50 MG/1
50 TABLET ORAL EVERY 6 HOURS PRN
Qty: 12 TABLET | Refills: 0 | Status: SHIPPED | OUTPATIENT
Start: 2024-12-05 | End: 2024-12-08

## 2024-12-05 RX ORDER — SODIUM CHLORIDE 9 MG/ML
INJECTION, SOLUTION INTRAVENOUS PRN
Status: DISCONTINUED | OUTPATIENT
Start: 2024-12-05 | End: 2024-12-05 | Stop reason: HOSPADM

## 2024-12-05 RX ORDER — SODIUM CHLORIDE 0.9 % (FLUSH) 0.9 %
5-40 SYRINGE (ML) INJECTION PRN
Status: DISCONTINUED | OUTPATIENT
Start: 2024-12-05 | End: 2024-12-05 | Stop reason: HOSPADM

## 2024-12-05 RX ORDER — DIPHENHYDRAMINE HYDROCHLORIDE 50 MG/ML
12.5 INJECTION INTRAMUSCULAR; INTRAVENOUS
Status: DISCONTINUED | OUTPATIENT
Start: 2024-12-05 | End: 2024-12-05 | Stop reason: HOSPADM

## 2024-12-05 RX ORDER — ONDANSETRON 2 MG/ML
4 INJECTION INTRAMUSCULAR; INTRAVENOUS
Status: DISCONTINUED | OUTPATIENT
Start: 2024-12-05 | End: 2024-12-05 | Stop reason: HOSPADM

## 2024-12-05 RX ORDER — SODIUM CHLORIDE 9 MG/ML
INJECTION, SOLUTION INTRAVENOUS
Status: DISCONTINUED | OUTPATIENT
Start: 2024-12-05 | End: 2024-12-05 | Stop reason: SDUPTHER

## 2024-12-05 RX ORDER — CELECOXIB 200 MG/1
200 CAPSULE ORAL ONCE
Status: COMPLETED | OUTPATIENT
Start: 2024-12-05 | End: 2024-12-05

## 2024-12-05 RX ORDER — SODIUM CHLORIDE 9 MG/ML
INJECTION, SOLUTION INTRAVENOUS CONTINUOUS
Status: DISCONTINUED | OUTPATIENT
Start: 2024-12-05 | End: 2024-12-05 | Stop reason: HOSPADM

## 2024-12-05 RX ADMIN — SODIUM CHLORIDE: 9 INJECTION, SOLUTION INTRAVENOUS at 06:58

## 2024-12-05 RX ADMIN — OXYCODONE 5 MG: 5 TABLET ORAL at 07:57

## 2024-12-05 RX ADMIN — EPHEDRINE SULFATE 10 MG: 50 INJECTION, SOLUTION INTRAVENOUS at 07:19

## 2024-12-05 RX ADMIN — PROPOFOL 200 MG: 10 INJECTION, EMULSION INTRAVENOUS at 07:00

## 2024-12-05 RX ADMIN — DEXAMETHASONE SODIUM PHOSPHATE 8 MG: 4 INJECTION, SOLUTION INTRAMUSCULAR; INTRAVENOUS at 07:14

## 2024-12-05 RX ADMIN — GLYCOPYRROLATE 0.2 MG: 0.2 INJECTION INTRAMUSCULAR; INTRAVENOUS at 07:15

## 2024-12-05 RX ADMIN — Medication 100 MCG: at 07:22

## 2024-12-05 RX ADMIN — Medication 100 MCG: at 07:24

## 2024-12-05 RX ADMIN — GLYCOPYRROLATE 0.2 MG: 0.2 INJECTION INTRAMUSCULAR; INTRAVENOUS at 07:22

## 2024-12-05 RX ADMIN — ACETAMINOPHEN 1000 MG: 500 TABLET ORAL at 06:42

## 2024-12-05 RX ADMIN — CELECOXIB 200 MG: 200 CAPSULE ORAL at 06:42

## 2024-12-05 RX ADMIN — LIDOCAINE HYDROCHLORIDE 100 MG: 20 INJECTION, SOLUTION INFILTRATION; PERINEURAL at 07:00

## 2024-12-05 RX ADMIN — EPHEDRINE SULFATE 10 MG: 50 INJECTION, SOLUTION INTRAVENOUS at 07:10

## 2024-12-05 RX ADMIN — ONDANSETRON 4 MG: 2 INJECTION, SOLUTION INTRAMUSCULAR; INTRAVENOUS at 07:14

## 2024-12-05 RX ADMIN — CEFAZOLIN 2000 MG: 2 INJECTION, POWDER, FOR SOLUTION INTRAMUSCULAR; INTRAVENOUS at 07:10

## 2024-12-05 RX ADMIN — Medication 100 MCG: at 07:30

## 2024-12-05 RX ADMIN — FENTANYL CITRATE 50 MCG: 50 INJECTION, SOLUTION INTRAMUSCULAR; INTRAVENOUS at 07:02

## 2024-12-05 RX ADMIN — EPHEDRINE SULFATE 10 MG: 50 INJECTION, SOLUTION INTRAVENOUS at 07:13

## 2024-12-05 ASSESSMENT — PAIN - FUNCTIONAL ASSESSMENT
PAIN_FUNCTIONAL_ASSESSMENT: 0-10

## 2024-12-05 ASSESSMENT — PAIN DESCRIPTION - ORIENTATION: ORIENTATION: LOWER

## 2024-12-05 ASSESSMENT — PAIN DESCRIPTION - DESCRIPTORS
DESCRIPTORS: SHARP;DISCOMFORT
DESCRIPTORS: SHARP;DISCOMFORT

## 2024-12-05 ASSESSMENT — PAIN SCALES - GENERAL
PAINLEVEL_OUTOF10: 6
PAINLEVEL_OUTOF10: 0

## 2024-12-05 ASSESSMENT — PAIN DESCRIPTION - LOCATION: LOCATION: LEG

## 2024-12-05 ASSESSMENT — ENCOUNTER SYMPTOMS: SHORTNESS OF BREATH: 1

## 2024-12-05 NOTE — DISCHARGE INSTRUCTIONS
Post op instruction:  1- D/C home  2- Dx Left ankle weakness/foot drop/common peroneal dysfunction.   3- WBAT left leg  4- Elevation surgical site, with ice  5- Keep Drsg dry and clean  6- F/U in 2 weeks.  7- For DVT prophylaxis- Aspirin 325 mg daily     Eleanor Llanos MD, 12/5/2024        GENERAL SURGERY DISCHARGE INSTRUCTIONS    Follow your surgeons instructions.  Follow up with your surgeon as directed.  Observe the operative area for signs of excessive bleeding.If needed apply pressure,elevate if able and contact your surgeon.  Observe the operative site for any signs of infection- such as increased pain,redness,fever greater than 101 degrees,swelling, foul odor or drainage.Contact your surgeon if any of these symptoms are present.  Keep operative site clean and dry.  Do not remove dressing unless instructed to by surgeon.  Apply ice as directed.  If unable to urinate once you are at home,  notify your surgeon or go to the Emergency Room.  Avoid pulling,pushing or tugging to suture line.  If you become short of breath call your doctor or go to the ER.  Take medications as directed.  Pain medication should be taken with food.  Do not drive or operate machinery while taking narcotics.  For any problems or question call your surgeon.     ANESTHESIA DISCHARGE INSTRUCTIONS    Wear your seatbelt home.  You are under the influence of drugs-do not drink alcohol,drive,operate machinery,or make any important decisions or sign any legal documentsfor 24 hours  A responsible adult needs to be with you for 24 hours.  You may experience lightheadedness,dizziness,or sleepiness following surgery.  Rest at home today- increase activity as tolerated.  Progress slowly to a regular diet unless your physician has instructed you otherwise.Drink plenty of water.  If nausea becomes a problem call your physician.  Coughing,sore throat,and muscle aches are other side effects of anesthesia,and should improve with time.  Do not

## 2024-12-05 NOTE — PROGRESS NOTES
Pt awake and alert.  Pt on RA, VSS.  Girlfriend and mother in the waiting room.  Pt with c/o pain, denies nausea, tolerating PO.  Skin warm LLE, palpable pulses and able to wiggle toes.  Pt meets criteria to be discharged from phase 1.

## 2024-12-05 NOTE — H&P
Preoperative H&P Update    The patient's History and Physical in the medical record from 12/3/2024 was reviewed by me today.    Past Medical History:   Diagnosis Date    Anxiety 9/23/2006    COPD (chronic obstructive pulmonary disease) ([facility])     Depression 9/23/2006    Erectile dysfunction 7/05/2023    Hypertension     Neuropathy 10/01/23    Type 2 diabetes mellitus without complication ([facility]) 2/10/23     Past Surgical History:   Procedure Laterality Date    HERNIA REPAIR  01/2/2024     No current facility-administered medications on file prior to encounter.     Current Outpatient Medications on File Prior to Encounter   Medication Sig Dispense Refill    rosuvastatin (CRESTOR) 20 MG tablet Take 1 tablet by mouth daily 90 tablet 3    albuterol sulfate HFA (PROVENTIL;VENTOLIN;PROAIR) 108 (90 Base) MCG/ACT inhaler Inhale 2 puffs into the lungs every 4 hours as needed for Wheezing 18 g 0    sildenafil (VIAGRA) 100 MG tablet Take 1 tablet by mouth as needed for Erectile Dysfunction      gabapentin (NEURONTIN) 400 MG capsule Take 1 capsule by mouth 3 times daily.      testosterone cypionate (DEPOTESTOTERONE CYPIONATE) 200 MG/ML injection Inject 1 mL into the muscle once.      LORazepam (ATIVAN) 1 MG tablet Take 1 tablet by mouth every 8 hours as needed for Anxiety.      venlafaxine (EFFEXOR XR) 75 MG extended release capsule Take 2 capsules by mouth daily 30 capsule 0    LUER LOCK SAFETY SYRINGES 22G X 1-1/2\" 3 ML MISC          No Known Allergies   I reviewed the HPI, medications, allergies, reason for surgery, diagnosis and treatment plan and there has been no change.    The patient was evaluated by me today. Physical exam findings for this update include:    Vitals:    12/05/24 0630   BP: 119/78   Pulse: (!) 46   Resp: 15   Temp: 97.6 °F (36.4 °C)   SpO2: 96%     Airway is intact  Chest: chest clear, no wheezing, rales, normal symmetric air entry, no tachypnea, retractions or cyanosis  Heart: regular rate and rhythm ;

## 2024-12-05 NOTE — ANESTHESIA POSTPROCEDURE EVALUATION
Department of Anesthesiology  Postprocedure Note    Patient: Ruddy Barillas  MRN: [id number]  YOB: 1965  Date of evaluation: 12/5/2024    Procedure Summary       Date: 12/05/24 Room / Location: [address]    Anesthesia Start: 0658 Anesthesia Stop: 0740    Procedure: LEFT LOWER LEG COMMON PERONEAL NERVE RELEASE (Left) Diagnosis:       Tear of peroneal tendon of left foot      (Tear of peroneal tendon of left foot [S86.312A])    Surgeons: Eleanor Llanos MD Responsible Provider: Ed Cao MD    Anesthesia Type: General ASA Status: 2            Anesthesia Type: General    Kirill Phase I: Kirill Score: 10    Kirill Phase II:      Anesthesia Post Evaluation    Patient location during evaluation: PACU  Patient participation: complete - patient participated  Level of consciousness: awake and alert  Pain score: 0  Airway patency: patent  Nausea & Vomiting: no nausea  Cardiovascular status: blood pressure returned to baseline  Respiratory status: acceptable  Hydration status: euvolemic  Pain management: adequate    No notable events documented.

## 2024-12-05 NOTE — PROGRESS NOTES
Pt arrived from OR to PACU bay 4.  Report received from OR staff.  Pt arouses to voice.  Surgical dressing in place to left leg.  Pt on RA, SB, VSS.

## 2024-12-05 NOTE — PROGRESS NOTES
Discharge instructions reviewed with patient/girlfriend Susu. All home medications have been reviewed, questions answered and patient verbalized understanding.  Discharge instructions signed.  Pt dc'd per wheelchair.  Patient discharged home with two medications and other belongings. Girlfriend taking stable pt home.

## 2024-12-06 PROBLEM — M79.A22 EXERTIONAL COMPARTMENT SYNDROME OF LEFT LOWER EXTREMITY: Status: ACTIVE | Noted: 2024-12-06

## 2024-12-06 PROBLEM — G57.02 COMPRESSION OF COMMON PERONEAL NERVE OF LEFT LOWER EXTREMITY: Status: ACTIVE | Noted: 2024-12-06

## 2024-12-06 NOTE — OP NOTE
[place] [id number]                            OPERATIVE REPORT      PATIENT NAME: LUIS A FRIAS             : 1965  MED REC NO: [id number]                      ROOM: [facility] OR  ACCOUNT NO: [id number]                       ADMIT DATE: 2024  PROVIDER: Eleanor Llanos MD      DATE OF PROCEDURE:  2024    SURGEON:  Eleanor Llanos MD    ASSISTANT:  Delaney Holt CNP    PREOPERATIVE DIAGNOSES:    1. Left footdrop with common peroneal nerve dysfunction/entrapment.  2. Exertion of compartment syndrome, left leg.    POSTOPERATIVE DIAGNOSES:    1. Left footdrop with common peroneal nerve dysfunction/entrapment.  2. Exertion of compartment syndrome, left leg.    PROCEDURES DONE:    1. Neuroplasty major peripheral nerve/common peroneal nerve, left leg at the fibular head.  2. Decompression fasciotomy left leg anterior compartment.    ANESTHESIA:  General anesthesia.    ESTIMATED BLOOD LOSS:  Minimal.    COMPLICATIONS:  None.    TOURNIQUET:  Left upper thigh 300 mmHg.    INDICATION:  This is a 59-year-old white male, who presented to our office with left footdrop.  He had an EMG that showed a common peroneal nerve entrapment at the fibular head.  All risks, benefits, and alternatives were discussed with the patient.  He agreed to proceed with the surgical release.    DESCRIPTION OF THE PROCEDURE:  The patient's left leg was marked.  He received 2 g Ancef IV preoperatively.  The patient was then brought to the operating room, underwent general anesthesia.  He was then brought to the operating room, underwent general anesthesia.  A well-padded tourniquet was placed to the left upper thigh.  He was then placed in the right lateral decubitus position with the left leg up.  He was secured in a bean bag with axillary roll.  The left lower extremity was then prepped and draped in regular sterile routine fashion.  A time-out was called

## 2024-12-06 NOTE — BRIEF OP NOTE
Brief Postoperative Note      Patient: Ruddy Barillas  YOB: 1965  MRN: [id number]    Date of Procedure: 12/5/2024    Pre-Op Diagnosis Codes:     Foot drop with common peroneal nerve entrapment. Exertional compartment syndrome.    Post-Op Diagnosis: Same       Procedure(s):  LEFT LOWER LEG COMMON PERONEAL NERVE RELEASE, DECOMPRESSION FASCIOTOMY LEFT LEG ANTERIOR DEPARTMENT.    Surgeon(s):  Eleanor Llanos MD    Assistant: ANDREW Holt    Anesthesia: General    Estimated Blood Loss (mL): Minimal    Complications: None    Specimens:   * No specimens in log *    Implants:  * No implants in log *      Drains: * No LDAs found *    Findings:  Infection Present At Time Of Surgery (PATOS) (choose all levels that have infection present):  No infection present  Other Findings: Same.    Electronically signed by Eleanor Llanos MD on 12/6/2024 at 7:55 AM

## 2024-12-16 DIAGNOSIS — F41.8 MIXED ANXIETY AND DEPRESSIVE DISORDER: ICD-10-CM

## 2024-12-16 DIAGNOSIS — E11.42 TYPE 2 DIABETES MELLITUS WITH DIABETIC POLYNEUROPATHY, WITHOUT LONG-TERM CURRENT USE OF INSULIN (HCC): ICD-10-CM

## 2024-12-17 ENCOUNTER — OFFICE VISIT (OUTPATIENT)
Dept: ORTHOPEDIC SURGERY | Age: 59
End: 2024-12-17

## 2024-12-17 VITALS — BODY MASS INDEX: 26.12 KG/M2 | HEIGHT: 75 IN | WEIGHT: 210.1 LBS

## 2024-12-17 DIAGNOSIS — M21.372 LEFT FOOT DROP: Primary | ICD-10-CM

## 2024-12-17 PROCEDURE — 99024 POSTOP FOLLOW-UP VISIT: CPT | Performed by: NURSE PRACTITIONER

## 2024-12-17 RX ORDER — VENLAFAXINE HYDROCHLORIDE 75 MG/1
150 CAPSULE, EXTENDED RELEASE ORAL DAILY
Qty: 30 CAPSULE | Refills: 0 | Status: SHIPPED | OUTPATIENT
Start: 2024-12-17

## 2024-12-17 RX ORDER — SEMAGLUTIDE 1.34 MG/ML
1 INJECTION, SOLUTION SUBCUTANEOUS
Qty: 4 ADJUSTABLE DOSE PRE-FILLED PEN SYRINGE | Refills: 0 | Status: SHIPPED | OUTPATIENT
Start: 2024-12-17

## 2024-12-17 NOTE — PROGRESS NOTES
DIAGNOSIS:  Left common peroneal neurolysis with compartment decompression fasciotomy.    DATE OF SURGERY:  12/5/2024.    HISTORY OF PRESENT ILLNESS:  Mr. Barillas 59 y.o.  male who came in today for 2 weeks postoperative visit.  The patient denies any significant pain in the left leg. He has been WBAT with no significant improvement of foot drop. He has chronic numbness or tingling sensation secondary to neuropathy and diabetes. No fever or Chills.     PHYSICAL EXAMINATION:  The incision healing well .  No signs of any erythema or drainage, no swelling. He has no pain with the active or passive range of motion of the left knee, good ROM.  He has intact sensation distally, with no improvement of foot drop.    IMPRESSION:  2 weeks out from left common peroneal neurolysis., and doing very well.    PLAN: He can go back to normal activity with no heavy impact activities for 4 weeks. Continue AFO brace until increased strength in foot drop.  The patient will come back for a follow up in 3 months with repeat EMG if needed.      The patient understands that there is a chance minimal to no improvement of the foot drop even after surgical release.    Delaney Holt, APRN - CNP

## 2024-12-17 NOTE — TELEPHONE ENCOUNTER
Medication:   Requested Prescriptions     Pending Prescriptions Disp Refills    venlafaxine (EFFEXOR XR) 75 MG extended release capsule 30 capsule 0     Sig: Take 2 capsules by mouth daily    OZEMPIC, 1 MG/DOSE, 4 MG/3ML SOPN sc injection 4 Adjustable Dose Pre-filled Pen Syringe 0     Sig: Inject 1 mg into the skin every 7 days      Last Filled:  8/28/24 & 11/21/24    Patient Phone Number: [phone] (home)     Last appt: 9/24/2024   Next appt: 1/3/2025    Last OARRS:        No data to display              PDMP Monitoring:    Last PDMP Allan as Reviewed ([place]):  Review User Review Instant Review Result   DENZEL ANTUNEZ 8/28/2024  4:48 PM Reviewed PDMP [1]     Preferred Pharmacy:   [facility]/pharmacy #[id number] - [place], [place] - [address] [phone] - F [phone]  [address] [facility] [address]  Phone: [phone] Fax: [phone]    Recent Visits  Date Type Provider Dept   09/24/24 Office Visit Denzel Antunez MD [facility]   08/28/24 Office Visit Denzel Antunez MD [facility]   Showing recent visits within past 540 days with a meds authorizing provider and meeting all other requirements  Future Appointments  Date Type Provider Dept   01/03/25 Appointment Denzel Antunez MD [facility]   Showing future appointments within next 150 days with a meds authorizing provider and meeting all other requirements     9/24/2024

## 2024-12-31 DIAGNOSIS — K21.9 GASTROESOPHAGEAL REFLUX DISEASE WITHOUT ESOPHAGITIS: ICD-10-CM

## 2024-12-31 RX ORDER — FAMOTIDINE 40 MG/1
40 TABLET, FILM COATED ORAL 2 TIMES DAILY
Qty: 60 TABLET | Refills: 0 | Status: SHIPPED | OUTPATIENT
Start: 2024-12-31

## 2024-12-31 NOTE — TELEPHONE ENCOUNTER
Medication:   Requested Prescriptions     Pending Prescriptions Disp Refills    famotidine (PEPCID) 40 MG tablet 30 tablet 0     Sig: Take 1 tablet by mouth 2 times daily      Last Filled:  11/19/24    Patient Phone Number: [phone] (home)     Last appt: 9/24/2024   Next appt: 1/3/2025    Last OARRS:        No data to display              PDMP Monitoring:    Last PDMP Allan as Reviewed ([place]):  Review User Review Instant Review Result   DENZEL ANTUNEZ 8/28/2024  4:48 PM Reviewed PDMP [1]     Preferred Pharmacy:   [facility]/pharmacy #[id number] [place], [place] - [address] [facility] [phone] - F [phone]  [facility] [facility] [address]  Phone: [phone] Fax: [phone]    Recent Visits  Date Type Provider Dept   09/24/24 Office Visit Denzel Antunez MD [facility]   08/28/24 Office Visit Denzel Antunez MD [facility]   Showing recent visits within past 540 days with a meds authorizing provider and meeting all other requirements  Future Appointments  Date Type Provider Dept   01/03/25 Appointment Denzel Antunez MD [facility]   Showing future appointments within next 150 days with a meds authorizing provider and meeting all other requirements     9/24/2024

## 2025-01-03 ENCOUNTER — TELEPHONE (OUTPATIENT)
Dept: ORTHOPEDIC SURGERY | Age: 60
End: 2025-01-03

## 2025-01-03 ENCOUNTER — TELEPHONE (OUTPATIENT)
Dept: PRIMARY CARE CLINIC | Age: 60
End: 2025-01-03

## 2025-01-03 ENCOUNTER — OFFICE VISIT (OUTPATIENT)
Dept: PRIMARY CARE CLINIC | Age: 60
End: 2025-01-03
Payer: COMMERCIAL

## 2025-01-03 VITALS
HEART RATE: 67 BPM | OXYGEN SATURATION: 98 % | BODY MASS INDEX: 26.75 KG/M2 | WEIGHT: 214 LBS | SYSTOLIC BLOOD PRESSURE: 128 MMHG | TEMPERATURE: 98.1 F | DIASTOLIC BLOOD PRESSURE: 78 MMHG

## 2025-01-03 DIAGNOSIS — G57.02 COMPRESSION OF COMMON PERONEAL NERVE OF LEFT LOWER EXTREMITY: Primary | ICD-10-CM

## 2025-01-03 DIAGNOSIS — E11.42 TYPE 2 DIABETES MELLITUS WITH DIABETIC POLYNEUROPATHY, WITHOUT LONG-TERM CURRENT USE OF INSULIN (HCC): ICD-10-CM

## 2025-01-03 DIAGNOSIS — I10 ESSENTIAL HYPERTENSION: ICD-10-CM

## 2025-01-03 DIAGNOSIS — F41.8 MIXED ANXIETY AND DEPRESSIVE DISORDER: ICD-10-CM

## 2025-01-03 DIAGNOSIS — M96.842 POSTOPERATIVE SEROMA OF MUSCULOSKELETAL STRUCTURE AFTER MUSCULOSKELETAL PROCEDURE: Primary | ICD-10-CM

## 2025-01-03 PROCEDURE — 99215 OFFICE O/P EST HI 40 MIN: CPT | Performed by: STUDENT IN AN ORGANIZED HEALTH CARE EDUCATION/TRAINING PROGRAM

## 2025-01-03 PROCEDURE — 1036F TOBACCO NON-USER: CPT | Performed by: STUDENT IN AN ORGANIZED HEALTH CARE EDUCATION/TRAINING PROGRAM

## 2025-01-03 PROCEDURE — G8419 CALC BMI OUT NRM PARAM NOF/U: HCPCS | Performed by: STUDENT IN AN ORGANIZED HEALTH CARE EDUCATION/TRAINING PROGRAM

## 2025-01-03 PROCEDURE — 3017F COLORECTAL CA SCREEN DOC REV: CPT | Performed by: STUDENT IN AN ORGANIZED HEALTH CARE EDUCATION/TRAINING PROGRAM

## 2025-01-03 PROCEDURE — G8427 DOCREV CUR MEDS BY ELIG CLIN: HCPCS | Performed by: STUDENT IN AN ORGANIZED HEALTH CARE EDUCATION/TRAINING PROGRAM

## 2025-01-03 PROCEDURE — 3078F DIAST BP <80 MM HG: CPT | Performed by: STUDENT IN AN ORGANIZED HEALTH CARE EDUCATION/TRAINING PROGRAM

## 2025-01-03 PROCEDURE — 3074F SYST BP LT 130 MM HG: CPT | Performed by: STUDENT IN AN ORGANIZED HEALTH CARE EDUCATION/TRAINING PROGRAM

## 2025-01-03 PROCEDURE — 2022F DILAT RTA XM EVC RTNOPTHY: CPT | Performed by: STUDENT IN AN ORGANIZED HEALTH CARE EDUCATION/TRAINING PROGRAM

## 2025-01-03 PROCEDURE — 3046F HEMOGLOBIN A1C LEVEL >9.0%: CPT | Performed by: STUDENT IN AN ORGANIZED HEALTH CARE EDUCATION/TRAINING PROGRAM

## 2025-01-03 RX ORDER — FENOFIBRATE 145 MG/1
145 TABLET, COATED ORAL DAILY
COMMUNITY
Start: 2024-11-24

## 2025-01-03 RX ORDER — HYDROXYZINE HYDROCHLORIDE 25 MG/1
25 TABLET, FILM COATED ORAL EVERY 8 HOURS PRN
Qty: 30 TABLET | Refills: 0 | Status: SHIPPED | OUTPATIENT
Start: 2025-01-03 | End: 2025-01-13

## 2025-01-03 RX ORDER — OMEGA-3 FATTY ACIDS/FISH OIL 300-1000MG
1 CAPSULE ORAL DAILY
COMMUNITY

## 2025-01-03 ASSESSMENT — PATIENT HEALTH QUESTIONNAIRE - PHQ9
1. LITTLE INTEREST OR PLEASURE IN DOING THINGS: SEVERAL DAYS
9. THOUGHTS THAT YOU WOULD BE BETTER OFF DEAD, OR OF HURTING YOURSELF: NOT AT ALL
7. TROUBLE CONCENTRATING ON THINGS, SUCH AS READING THE NEWSPAPER OR WATCHING TELEVISION: SEVERAL DAYS
5. POOR APPETITE OR OVEREATING: SEVERAL DAYS
3. TROUBLE FALLING OR STAYING ASLEEP: SEVERAL DAYS
10. IF YOU CHECKED OFF ANY PROBLEMS, HOW DIFFICULT HAVE THESE PROBLEMS MADE IT FOR YOU TO DO YOUR WORK, TAKE CARE OF THINGS AT HOME, OR GET ALONG WITH OTHER PEOPLE: VERY DIFFICULT
SUM OF ALL RESPONSES TO PHQ QUESTIONS 1-9: 7
4. FEELING TIRED OR HAVING LITTLE ENERGY: SEVERAL DAYS
SUM OF ALL RESPONSES TO PHQ QUESTIONS 1-9: 7
6. FEELING BAD ABOUT YOURSELF - OR THAT YOU ARE A FAILURE OR HAVE LET YOURSELF OR YOUR FAMILY DOWN: SEVERAL DAYS
SUM OF ALL RESPONSES TO PHQ9 QUESTIONS 1 & 2: 2
8. MOVING OR SPEAKING SO SLOWLY THAT OTHER PEOPLE COULD HAVE NOTICED. OR THE OPPOSITE, BEING SO FIGETY OR RESTLESS THAT YOU HAVE BEEN MOVING AROUND A LOT MORE THAN USUAL: NOT AT ALL
SUM OF ALL RESPONSES TO PHQ QUESTIONS 1-9: 7
2. FEELING DOWN, DEPRESSED OR HOPELESS: SEVERAL DAYS
SUM OF ALL RESPONSES TO PHQ QUESTIONS 1-9: 7

## 2025-01-03 ASSESSMENT — ANXIETY QUESTIONNAIRES
IF YOU CHECKED OFF ANY PROBLEMS ON THIS QUESTIONNAIRE, HOW DIFFICULT HAVE THESE PROBLEMS MADE IT FOR YOU TO DO YOUR WORK, TAKE CARE OF THINGS AT HOME, OR GET ALONG WITH OTHER PEOPLE: VERY DIFFICULT
GAD7 TOTAL SCORE: 17
1. FEELING NERVOUS, ANXIOUS, OR ON EDGE: SEVERAL DAYS
4. TROUBLE RELAXING: NEARLY EVERY DAY
6. BECOMING EASILY ANNOYED OR IRRITABLE: NEARLY EVERY DAY
5. BEING SO RESTLESS THAT IT IS HARD TO SIT STILL: NEARLY EVERY DAY
7. FEELING AFRAID AS IF SOMETHING AWFUL MIGHT HAPPEN: SEVERAL DAYS
2. NOT BEING ABLE TO STOP OR CONTROL WORRYING: NEARLY EVERY DAY
3. WORRYING TOO MUCH ABOUT DIFFERENT THINGS: NEARLY EVERY DAY

## 2025-01-03 ASSESSMENT — ENCOUNTER SYMPTOMS
VOMITING: 0
SHORTNESS OF BREATH: 0
DIARRHEA: 0
ABDOMINAL PAIN: 0
COUGH: 0
NAUSEA: 0

## 2025-01-03 NOTE — ASSESSMENT & PLAN NOTE
- Last A1C:   Hemoglobin A1C   Date Value Ref Range Status   2024 4.9 See comment % Final     Comment:     Comment:  Diagnosis of Diabetes: > or = 6.5%  Increased risk of diabetes (Prediabetes): 5.7-6.4%  Glycemic Control: Nonpregnant Adults: <7.0%                    Pregnant: <6.0%          - Current medication regimen:   Key Antihyperglycemic Medications               OZEMPIC, 1 MG/DOSE, 4 MG/3ML SOPN sc injection (Taking) Inject 1 mg into the skin every 7 days           - Sugar Log: No  - Diet/Exercise: Yes  - Last microalbumin (Nephropathy screening at least yearly): No results found for: \"MALBCR\"  - Last eye exam (Retinopathy screening yearly): Due  - Last foot exam (Diabetic Foot Exam Yearly): Done today, abnormal  - Smoking status:   Tobacco Use      Smoking status: Former        Packs/day: 0.00        Years: 2.0 packs/day for 45.0 years (90.0 ttl pk-yrs)        Types: Cigarettes        Start date: 1979        Quit date:         Years since quittin.0      Smokeless tobacco: Never    - Statin: no  - ACE/ARB: no  - Last lipid panel/LDL:   Lab Results   Component Value Date     (H) 2024    HDL 37 (L) 2024      - Indicating need for: desc; high Intensity Statin  - PCV20: recommended, but will be administered at next appt  Plan  -Continue current regimen  -Discussed PCV 20 further, which will be administered at the next appointment

## 2025-01-03 NOTE — PROGRESS NOTES
Position: Sitting   Cuff Size: Large Adult   Pulse: 67   Temp: 98.1 °F (36.7 °C)   SpO2: 98%   Weight: 97.1 kg (214 lb)     Estimated body mass index is 26.75 kg/m² as calculated from the following:    Height as of 12/17/24: 1.905 m (6' 3\").    Weight as of this encounter: 97.1 kg (214 lb).    Physical Exam  Vitals reviewed.   Constitutional:       General: He is not in acute distress.     Appearance: Normal appearance. He is not ill-appearing or toxic-appearing.   HENT:      Head: Normocephalic and atraumatic.      Nose: Nose normal.      Mouth/Throat:      Mouth: Mucous membranes are moist.      Pharynx: Oropharynx is clear.   Cardiovascular:      Rate and Rhythm: Normal rate and regular rhythm.      Heart sounds: Normal heart sounds. No murmur heard.     No friction rub. No gallop.   Pulmonary:      Effort: Pulmonary effort is normal.      Breath sounds: Normal breath sounds. No wheezing, rhonchi or rales.   Abdominal:      General: Abdomen is flat. There is no distension.      Palpations: Abdomen is soft. There is no mass.      Tenderness: There is no abdominal tenderness. There is no guarding or rebound.   Musculoskeletal:         General: Swelling present. Normal range of motion.      Cervical back: Normal range of motion.      Comments: Seroma present left lower lateral leg.  Left lower leg edema.  No calf tenderness.  No warmth or erythema.  Abnormal diabetic foot exam as below.   Skin:     General: Skin is warm and dry.   Neurological:      General: No focal deficit present.      Mental Status: He is alert.   Psychiatric:         Mood and Affect: Mood normal.         Behavior: Behavior normal.         Visual inspection:  Deformity/amputation: absent  Skin lesions/pre-ulcerative calluses: absent  Edema: right- negative, left- trace    Sensory exam:  Monofilament sensation: abnormal - neuropathy present throughout bilateral lower extremities  (minimum of 5 random plantar locations tested, avoiding callused

## 2025-01-03 NOTE — ASSESSMENT & PLAN NOTE
- BP today: 128/78, Goal BP: <140/90 per JNC8 Guidelines  - BP Log: No  - Current medication regimen: None  - Diet/Exercise: Yes  - Tobacco Use: Yes  - Labs Reviewed:  BMP:   Lab Results   Component Value Date/Time     08/29/2024 02:16 PM    K 4.7 08/29/2024 02:16 PM     08/29/2024 02:16 PM    CO2 29 08/29/2024 02:16 PM    BUN 14 08/29/2024 02:16 PM    CREATININE 1.1 08/29/2024 02:16 PM    GLUCOSE 93 08/29/2024 02:16 PM    CALCIUM 9.7 08/29/2024 02:16 PM      Micro/Creatinine: No results found for: \"LABCREA\", \"MALBCR\"   Lipid:   Lab Results   Component Value Date/Time    TRIG 217 08/29/2024 02:16 PM    HDL 37 08/29/2024 02:16 PM     Plan  - Continue following with cardiology

## 2025-01-03 NOTE — TELEPHONE ENCOUNTER
Called Dr.Arebi Steffany Batres and spoke with Love to try and secure an office visit in regards to patients postop swelling, possible seroma to LLE. Love states  is placing order for venous doppler to r/o DVT - gave central scheduling phone number for patient to schedule venous doppler.

## 2025-01-03 NOTE — ASSESSMENT & PLAN NOTE
Discussed likely diagnosis of seroma, left suspicion for DVT or compartment syndrome but given unilateral leg swelling these need to be ruled out as well.  -Discussed with the orthopedic office who will place the order for the ultrasound and would like us to have the patient call the office to schedule an appointment

## 2025-01-03 NOTE — TELEPHONE ENCOUNTER
SW patient's PCP. She stated the patient is in the office concerning a knot on his leg under his surgical incision.   The PCP was concerned with possible DVT or compartment syndrome.     Per Dr. Llanos, an order for a venous doppler was placed to check for a DVT.

## 2025-01-03 NOTE — ASSESSMENT & PLAN NOTE
PHQ-9 Total Score: 7 (1/3/2025  7:31 AM)  Thoughts that you would be better off dead, or of hurting yourself in some way: 0 (1/3/2025  7:31 AM)        1/3/2025     7:32 AM 8/28/2024     4:38 PM   LEATHA-7 SCREENING   Feeling nervous, anxious, or on edge Several days Nearly every day   Not being able to stop or control worrying Nearly every day Nearly every day   Worrying too much about different things Nearly every day Nearly every day   Trouble relaxing Nearly every day More than half the days   Being so restless that it is hard to sit still Nearly every day More than half the days   Becoming easily annoyed or irritable Nearly every day Nearly every day   Feeling afraid as if something awful might happen Several days More than half the days   LEATHA-7 Total Score 17 18   How difficult have these problems made it for you to do your work, take care of things at home, or get along with other people? Very difficult Very difficult     - Continue effexor 150 mg  - We discussed that I do not prescribe ativan for anxiety, will do hydroxyzine PRN  - Information to [facility] provided

## 2025-01-07 DIAGNOSIS — F41.8 MIXED ANXIETY AND DEPRESSIVE DISORDER: ICD-10-CM

## 2025-01-07 RX ORDER — VENLAFAXINE HYDROCHLORIDE 75 MG/1
150 CAPSULE, EXTENDED RELEASE ORAL DAILY
Qty: 60 CAPSULE | Refills: 0 | Status: SHIPPED | OUTPATIENT
Start: 2025-01-07

## 2025-01-07 NOTE — TELEPHONE ENCOUNTER
Medication:   Requested Prescriptions     Pending Prescriptions Disp Refills    venlafaxine (EFFEXOR XR) 75 MG extended release capsule 30 capsule 0     Sig: Take 2 capsules by mouth daily      Last Filled:  12/17/2024    Patient Phone Number: [phone] (home)     Last appt: 1/3/2025   Next appt: 1/28/2025    Last OARRS:        No data to display              PDMP Monitoring:    Last PDMP Allan as Reviewed ([place]):  Review User Review Instant Review Result   DENZEL ANTUNEZ 8/28/2024  4:48 PM Reviewed PDMP [1]     Preferred Pharmacy:   [facility]/pharmacy #[id number] - [place], [place] - [address] [phone] - F [phone]  [address] [facility] [address]  Phone: [phone] Fax: [phone]    Recent Visits  Date Type Provider Dept   01/03/25 Office Visit Denzel Antunez MD [facility]   09/24/24 Office Visit Denzel Antunez MD [facility]   08/28/24 Office Visit Denzel Antunez MD [facility]   Showing recent visits within past 540 days with a meds authorizing provider and meeting all other requirements  Future Appointments  Date Type Provider Dept   01/28/25 Appointment Denzel Antunez MD [facility]   Showing future appointments within next 150 days with a meds authorizing provider and meeting all other requirements     1/3/2025

## 2025-01-08 ENCOUNTER — PATIENT MESSAGE (OUTPATIENT)
Dept: PRIMARY CARE CLINIC | Age: 60
End: 2025-01-08

## 2025-01-08 NOTE — TELEPHONE ENCOUNTER
Sent [employer] message with instrution to contact [facility] psychiatry for management of anti-anxiety medications and left their ffice ph#[phone].

## 2025-01-31 ENCOUNTER — CLINICAL DOCUMENTATION (OUTPATIENT)
Dept: CARDIOLOGY CLINIC | Age: 60
End: 2025-01-31

## 2025-02-18 ENCOUNTER — TELEPHONE (OUTPATIENT)
Dept: PRIMARY CARE CLINIC | Age: 60
End: 2025-02-18

## 2025-02-18 NOTE — TELEPHONE ENCOUNTER
NAYELI with Ruddy to see if he received his Cologuard and if so, when did he mail it out to the address on the kit.

## 2025-02-25 DIAGNOSIS — F41.8 MIXED ANXIETY AND DEPRESSIVE DISORDER: ICD-10-CM

## 2025-02-25 RX ORDER — HYDROXYZINE HYDROCHLORIDE 25 MG/1
25 TABLET, FILM COATED ORAL EVERY 8 HOURS PRN
Qty: 30 TABLET | Refills: 0 | Status: SHIPPED | OUTPATIENT
Start: 2025-02-25 | End: 2025-03-07

## 2025-02-25 RX ORDER — VENLAFAXINE HYDROCHLORIDE 75 MG/1
150 CAPSULE, EXTENDED RELEASE ORAL DAILY
Qty: 60 CAPSULE | Refills: 0 | Status: SHIPPED | OUTPATIENT
Start: 2025-02-25

## 2025-02-25 NOTE — TELEPHONE ENCOUNTER
Medication:   Requested Prescriptions     Pending Prescriptions Disp Refills    hydrOXYzine HCl (ATARAX) 25 MG tablet 30 tablet 0     Sig: Take 1 tablet by mouth every 8 hours as needed for Anxiety      Last Filled:  1/3/25    Patient Phone Number: [phone] (home)     Last appt: 1/3/2025   Next appt: 2/26/2025    Last OARRS:        No data to display              PDMP Monitoring:    Last PDMP [place] as Reviewed ([place]):  Review User Review Instant Review Result   DENZEL ANTUNEZ 8/28/2024  4:48 PM Reviewed PDMP [1]     Preferred Pharmacy:   [facility]/pharmacy #[id number] - [place], [place] - [address] [facility] [phone] - F [phone]  [address] [facility] [address]  Phone: [phone] Fax: [phone]    Recent Visits  Date Type Provider Dept   01/03/25 Office Visit Denzel Antunez MD [facility]   09/24/24 Office Visit Denzel Antunez MD [facility]   08/28/24 Office Visit Denzel Antunez MD [facility]   Showing recent visits within past 540 days with a meds authorizing provider and meeting all other requirements  Future Appointments  Date Type Provider Dept   02/26/25 Appointment Denzel Antunez MD [facility]   Showing future appointments within next 150 days with a meds authorizing provider and meeting all other requirements     1/3/2025

## 2025-02-25 NOTE — TELEPHONE ENCOUNTER
Medication:   Requested Prescriptions     Pending Prescriptions Disp Refills    venlafaxine (EFFEXOR XR) 75 MG extended release capsule 60 capsule 0     Sig: Take 2 capsules by mouth daily      Last Filled:  1/7/25    Patient Phone Number: [phone] (home)     Last appt: 1/3/2025   Next appt: 2/26/2025    Last OARRS:        No data to display              PDMP Monitoring:    Last PDMP Allan as Reviewed ([place]):  Review User Review Instant Review Result   DENZEL ANTUNEZ 8/28/2024  4:48 PM Reviewed PDMP [1]     Preferred Pharmacy:   [facility]/pharmacy #[id number] - [place], [place] - [address] [phone] - F [phone]  [facility] [facility] [address]  Phone: [phone] Fax: [phone]    Recent Visits  Date Type Provider Dept   01/03/25 Office Visit Denzel Antunez MD [facility]   09/24/24 Office Visit Denzel Antunez MD [facility]   08/28/24 Office Visit Denzel Antunez MD [facility]   Showing recent visits within past 540 days with a meds authorizing provider and meeting all other requirements  Future Appointments  Date Type Provider Dept   02/26/25 Appointment Denzel Antunez MD [facility]   Showing future appointments within next 150 days with a meds authorizing provider and meeting all other requirements     1/3/2025

## 2025-03-07 ENCOUNTER — TELEPHONE (OUTPATIENT)
Dept: PRIMARY CARE CLINIC | Age: 60
End: 2025-03-07

## 2025-03-07 NOTE — TELEPHONE ENCOUNTER
PRIOR AUTHORIZATION FOR MEDICATION  PATIENT:Ruddy Mckenzielillie  :1965  MEDICATION:Ozempic 1mg/dose (4mg/3ml)

## 2025-03-11 NOTE — TELEPHONE ENCOUNTER
Submitted PA for Ozempic (1 MG/DOSE) 4MG/3ML pen-injectors  Via Phone  Ref # [id number]  to [facility] STATUS: PENDING.    Follow up done daily; if no decision with in three days we will refax.  If another three days goes by with no decision will call the insurance for status.

## 2025-03-14 NOTE — TELEPHONE ENCOUNTER
I called to check status. Patient has a termed account. Please contact patient and verify pharmacy benefits and demographics. Please advise.    If this requires a response please respond to the pool. ([facility] MEDICINE Pre-Auth).    Please advise patient thank you.

## 2025-03-19 ENCOUNTER — TELEPHONE (OUTPATIENT)
Dept: PRIMARY CARE CLINIC | Age: 60
End: 2025-03-19

## 2025-03-19 NOTE — TELEPHONE ENCOUNTER
Attempted to call patient to verify pharmacy benefit coverage and phone number listed [phone] is out of service. I then contacted his listed emergency contact Bre Rincon [phone] and left  asking her to have patient return the call as the phone number we have listed is OOS, left [facility] office number for return call.

## 2025-03-20 DIAGNOSIS — F41.8 MIXED ANXIETY AND DEPRESSIVE DISORDER: ICD-10-CM

## 2025-03-20 RX ORDER — VENLAFAXINE HYDROCHLORIDE 75 MG/1
150 CAPSULE, EXTENDED RELEASE ORAL DAILY
Qty: 60 CAPSULE | Refills: 0 | Status: SHIPPED | OUTPATIENT
Start: 2025-03-20

## 2025-03-20 NOTE — TELEPHONE ENCOUNTER
Medication:   Requested Prescriptions     Pending Prescriptions Disp Refills    venlafaxine (EFFEXOR XR) 75 MG extended release capsule 60 capsule 0     Sig: Take 2 capsules by mouth daily      Last Filled:  2/25/25    Patient Phone Number: [phone] (home)     Last appt: 1/3/2025   Next appt: Visit date not found    Last OARRS:        No data to display              PDMP Monitoring:    Last PDMP Allan as Reviewed ([place]):  Review User Review Instant Review Result   DENZEL ANTUNEZ 8/28/2024  4:48 PM Reviewed PDMP [1]     Preferred Pharmacy:   [facility]/pharmacy #[id number] [place], [place] - [address] [phone] - F [phone]  [address] [place] [address]  Phone: [phone] Fax: [phone]    Recent Visits  Date Type Provider Dept   01/03/25 Office Visit Denzel Antunez MD [facility]   09/24/24 Office Visit Denzel Antunez MD [facility]   08/28/24 Office Visit Denzel Antunez MD [facility]   Showing recent visits within past 540 days with a meds authorizing provider and meeting all other requirements  Future Appointments  No visits were found meeting these conditions.  Showing future appointments within next 150 days with a meds authorizing provider and meeting all other requirements     1/3/2025

## 2025-07-30 ENCOUNTER — TELEPHONE (OUTPATIENT)
Dept: PRIMARY CARE CLINIC | Age: 60
End: 2025-07-30

## 2025-08-18 ENCOUNTER — TELEPHONE (OUTPATIENT)
Dept: PRIMARY CARE CLINIC | Age: 60
End: 2025-08-18

## (undated) DEVICE — MASTISOL ADHESIVE LIQ 2/3ML

## (undated) DEVICE — 3M™ COBAN™ NL STERILE NON-LATEX SELF-ADHERENT WRAP, 2086S, 6 IN X 5 YD (15 CM X 4,5 M), 12 ROLLS/CASE: Brand: 3M™ COBAN™

## (undated) DEVICE — SUTURE MONOCRYL + SZ 4-0 L27IN ABSRB UD L19MM PS-2 3/8 CIR MCP426H

## (undated) DEVICE — DRESSING,GAUZE,XEROFORM,CURAD,1"X8",ST: Brand: CURAD

## (undated) DEVICE — APPLICATOR MEDICATED 26 CC SOLUTION HI LT ORNG CHLORAPREP

## (undated) DEVICE — 3M™ STERI-STRIP™ REINFORCED ADHESIVE SKIN CLOSURES, R1547, 1/2 IN X 4 IN (12 MM X 100 MM), 6 STRIPS/ENVELOPE: Brand: 3M™ STERI-STRIP™

## (undated) DEVICE — SOLUTION IRRIG 1000ML 0.9% SOD CHL USP POUR PLAS BTL

## (undated) DEVICE — ELECTRODE PT RET AD L9FT HI MOIST COND ADH HYDRGEL CORDED

## (undated) DEVICE — SUTURE VICRYL + SZ 3-0 L18IN ABSRB UD SH 1/2 CIR TAPERCUT NDL VCP864D

## (undated) DEVICE — LOWER EXTREMITY: Brand: MEDLINE INDUSTRIES, INC.

## (undated) DEVICE — BANDAGE COMPR W6INXL10YD ST M E WHITE/BEIGE

## (undated) DEVICE — BLADE CLIPPER GEN PURP NS

## (undated) DEVICE — SUTURE VICRYL + SZ 2-0 L18IN ABSRB UD CT1 L36MM 1/2 CIR VCP839D